# Patient Record
Sex: MALE | Race: WHITE | HISPANIC OR LATINO | Employment: FULL TIME | URBAN - METROPOLITAN AREA
[De-identification: names, ages, dates, MRNs, and addresses within clinical notes are randomized per-mention and may not be internally consistent; named-entity substitution may affect disease eponyms.]

---

## 2018-07-18 ENCOUNTER — THERAPY VISIT (OUTPATIENT)
Dept: OCCUPATIONAL THERAPY | Facility: CLINIC | Age: 23
End: 2018-07-18
Payer: COMMERCIAL

## 2018-07-18 DIAGNOSIS — M25.632 WRIST STIFFNESS, LEFT: Primary | ICD-10-CM

## 2018-07-18 DIAGNOSIS — S62.009A SCAPHOID FRACTURE: ICD-10-CM

## 2018-07-18 DIAGNOSIS — Z47.89 AFTERCARE FOLLOWING SURGERY OF THE MUSCULOSKELETAL SYSTEM: ICD-10-CM

## 2018-07-18 PROCEDURE — 97110 THERAPEUTIC EXERCISES: CPT | Mod: GO | Performed by: OCCUPATIONAL THERAPIST

## 2018-07-18 PROCEDURE — 97165 OT EVAL LOW COMPLEX 30 MIN: CPT | Mod: GO | Performed by: OCCUPATIONAL THERAPIST

## 2018-07-18 NOTE — PROGRESS NOTES
Hand Therapy Initial Evaluation    Current Date:  7/18/2018    Diagnosis: L scaphoid fracture ORIF  DOI: 03/03/18  DOS:   3/9/18   04/02/18  Procedure:  ORIF  Post:  >3 months    Precautions: NA    Subjective:  Maulik Connors is a 22 year old right hand dominant male.    Patient reports symptoms of pain, stiffness/loss of motion, weakness/loss of strength and edema of the left wrist which occurred due to fell snowboarding. Since onset symptoms are Gradually getting better.  Special tests:  x-ray and CT.  Previous treatment: scaphoid pinned on 3/9, then 2nd surgery w/bone graft on 4/2.    General health as reported by patient is excellent.  Pertinent medical history includes:None  Medical allergies:none.  Surgical history: orthopedic: wrists, other: tooth implant.  Medication history: vitamin C & D, claritin.    Occupational Profile Information:  Current occupation is student-computer science  Currently working in normal job without restrictions  Job Tasks: Computer Work  Prior functional level:  no limitations  Barriers include:none  Mobility: No difficulty  Transportation: drives  Leisure activities/hobbies: golfing, wake boarding    Functional Outcome Measure:   Upper Extremity Functional Index Score:  SCORE:   Column Totals: /80: 66   (A lower score indicates greater disability.)    Objective:  Pain Level Report  VAS(0-10) 7/18/2018   At Rest: 0/10   At worst: 6/10     Report of Pain:  Location:  Volar wrist, thumb CMC joint, dorsa/radial wrist  Pain Quality:  Aching and sore, dull  Frequency: intermittent    Pain is worst:  daytime  Exacerbated by:  Wrist movement  Relieved by:  rest  Progression:  improving  Edema:  MILD  Sensation: WNL throughout all nerve distributions; per patient report    ROM  Wrist  7/18/2018   AROM (PROM) right left   Extension 76 56   Flexion 66 14   RD 21 11   UD 32 16   Supination 90 90   Pronation 80 90     Strength:   (Measured in pounds)  Pain Report:  - none    + mild    ++  moderate    +++ severe     7/18/2018   Trials right left   1  2  3 96  92  79 42  46  45   Average: 89 44     Lat Pinch  7/18/2018   Trials right left   1  2  3 18  19  19 12  11  12   Average: 19 12     3 Pt Pinch  7/18/2018   Trials right left   1  2  3 18  16  17 10  12  11   Average: 17 11     Assessment:  Patient presents with symptoms consistent with diagnosis of left scaphoid fracture ORIF,  with surgical  intervention.     Patient's limitations or Problem List includes:  Decreased ROM/motion and Weakness of the left wrist which interferes with the patient's ability to perform Self Care Tasks (dressing, eating, bathing, hygiene/toileting), Work Tasks, Sleep Patterns, Recreational Activities, Household Chores and Driving  as compared to previous level of function.    Rehab Potential:  Good - Return to full activity, some limitations    Patient will benefit from skilled Occupational Therapy to increase ROM, overall strength and stability of wrist and decrease pain, edema and adherence of scarring to return to previous activity level and resume normal daily tasks and to reach their rehab potential.    Barriers to Learning:  No barrier    Communication Issues:  Patient appears to be able to clearly communicate and understand verbal and written communication and follow directions correctly.    Chart Review: Simple history review with patient    Identified Performance Deficits: bathing/showering, toileting, dressing, feeding, hygiene and grooming, driving and community mobility, work and leisure activities    Assessment of Occupational Performance:  5 or more Performance Deficits    Clinical Decision Making (Complexity): Low complexity    Treatment Explanation:  The following has been discussed with the patient:  RX ordered/plan of care  Anticipated outcomes  Possible risks and side effects    Plan:  Frequency:  1 X week, once daily  Duration:  for 6 weeks    Treatment Plan:   Modalities:  Fluidotherapy and  Paraffin  Therapeutic Exercise:  AROM, AAROM, PROM, Isotonics, Isometrics and Stabilization  Manual Techniques:  Scar mobilization and Myofascial release  Orthotic Fabrication:  PRN to increase ROM  Self Care:  Self Care Tasks  Discharge Plan:  Achieve all LTG.  Independent in home treatment program.  Reach maximal therapeutic benefit.    Home Exercise Program:  Weaning from OTC orthosis  AROM   Wrist  Strengthening      Supination/pronation    Next Visit:  A/AA/PROM  MFR  Scar mgmt  Strengthening, as appropriate

## 2018-07-18 NOTE — MR AVS SNAPSHOT
After Visit Summary   7/18/2018    Maulik Connors    MRN: [id number]           Patient Information     Date Of Birth          1995        Visit Information        Provider Department      7/18/2018 7:00 AM Karlie Flores OT [facility]        Today's Diagnoses     Wrist stiffness, left    -  1    Scaphoid fracture        Aftercare following surgery of the musculoskeletal system           Follow-ups after your visit        Your next 10 appointments already scheduled     Jul 25, 2018  7:00 AM CDT   ERUM Hand with Becky Fitzpatrick    [facility] ([facility])    [address] [place] [address] [id number] [phone]            Aug 01, 2018  5:00 PM CDT   ERUM Hand with FREIDA [facility] ([facility])    [address] [place] [address] [id number] [phone]            Aug 09, 2018  5:00 PM CDT   ERUM Hand with FREIDA [facility] ([facility])    [address] [place] [address] [id number] [phone]              Who to contact     If you have questions or need follow up information about today's clinic visit or your schedule please contact [facility] directly at [phone].  Normal or non-critical lab and imaging results will be communicated to you by MyChart, letter or phone within 4 business days after the clinic has received the results. If you do not hear from us within 7 days, please contact the clinic through MyChart or phone. If you have a critical or abnormal lab result, we will notify you by phone as soon as possible.  Submit refill requests through Teralynk or call your pharmacy and they will forward the refill request to us. Please allow 3 business days for your refill to be completed.          Additional Information About Your Visit        MyChart Information     QoniacMaceo lets  "you send messages to your doctor, view your test results, renew your prescriptions, schedule appointments and more. To sign up, go to [email or web address] . Click on \"Log in\" on the left side of the screen, which will take you to the Welcome page. Then click on \"Sign up Now\" on the right side of the page.     You will be asked to enter the access code listed below, as well as some personal information. Please follow the directions to create your username and password.     Your access code is: [email or web address]  Expires: 10/10/2018  6:30 AM     Your access code will  in 90 days. If you need help or a new code, please call your [place] clinic or [phone].        Care EveryWhere ID     This is your Care EveryWhere ID. This could be used by other organizations to access your [place] medical records  [id number]         Blood Pressure from Last 3 Encounters:   No data found for BP    Weight from Last 3 Encounters:   No data found for Wt              We Performed the Following     HC OT EVAL, LOW COMPLEXITY     ERUM INITIAL EVAL REPORT     THERAPEUTIC EXERCISES        Primary Care Provider Fax #    Physician No Ref-Primary [phone]       No address on file        Equal Access to Services     CHRISSIE CHRISTIANSON : Hadcirilo Faith, waanneliseda katie, qaybta kaalmada adeamy, ruthy ramirez. So [facility] [phone].    ATENCIÓN: Si habla español, tiene a melgar disposición servicios gratuitos de asistencia lingüística. Llame al [phone].    We comply with applicable federal civil rights laws and [place] laws. We do not discriminate on the basis of race, color, national origin, age, disability, sex, sexual orientation, or gender identity.            Thank you!     Thank you for choosing [facility]  for your care. Our goal is always to provide you with excellent care. Hearing back from our patients is one way we can continue to improve our services. Please take a few " minutes to complete the written survey that you may receive in the mail after your visit with us. Thank you!             Your Updated Medication List - Protect others around you: Learn how to safely use, store and throw away your medicines at www.disposemymeds.org.      Notice  As of 7/18/2018  8:14 AM    You have not been prescribed any medications.

## 2018-07-18 NOTE — LETTER
[facility]  [address] [address]  [phone]    2018    Re: Maulik Connors   :   1995  MRN:  [id number]   REFERRING PHYSICIAN:   [facility]  Date of Initial Evaluation:  2018  Visits:  Rxs Used: 1  Reason for Referral:     Wrist stiffness, left  Scaphoid fracture  Aftercare following surgery of the musculoskeletal system    EVALUATION SUMMARY    Hand Therapy Initial Evaluation  Current Date:  2018  Diagnosis: L scaphoid fracture ORIF  DOI: 18  DOS:   3/9/18   04/02/18  Procedure:  ORIF  Post:  >3 months  Precautions: NA    Subjective:  Maulik Connors is a 22 year old right hand dominant male.  Patient reports symptoms of pain, stiffness/loss of motion, weakness/loss of strength and edema of the left wrist which occurred due to fell snowboarding. Since onset symptoms are Gradually getting better.  Special tests:  x-ray and CT.  Previous treatment: scaphoid pinned on 3/9, then 2nd surgery w/bone graft on .    General health as reported by patient is excellent.  Pertinent medical history includes:None  Medical allergies:none.  Surgical history: orthopedic: wrists, other: tooth implant.  Medication history: vitamin C & D, claritin.    Occupational Profile Information:  Current occupation is student-computer science  Currently working in normal job without restrictions  Job Tasks: Computer Work  Prior functional level:  no limitations  Barriers include:none  Mobility: No difficulty  Transportation: drives  Leisure activities/hobbies: golfing, wake boarding      Maulik Connors     Functional Outcome Measure:   Upper Extremity Functional Index Score:  SCORE:   Column Totals: /80: 66   (A lower score indicates greater disability.)    Objective:  Pain Level Report  VAS(0-10) 2018   At Rest: 0/10   At worst: 6/10     Report of Pain:  Location:  Volar wrist, thumb CMC joint, dorsa/radial wrist  Pain  Quality:  Aching and sore, dull  Frequency: intermittent    Pain is worst:  daytime  Exacerbated by:  Wrist movement  Relieved by:  rest  Progression:  improving  Edema:  MILD  Sensation: WNL throughout all nerve distributions; per patient report    ROM  Wrist  7/18/2018   AROM (PROM) right left   Extension 76 56   Flexion 66 14   RD 21 11   UD 32 16   Supination 90 90   Pronation 80 90     Strength:   (Measured in pounds)  Pain Report:  - none    + mild    ++ moderate    +++ severe     7/18/2018   Trials right left   1  2  3 96  92  79 42  46  45   Average: 89 44     Lat Pinch  7/18/2018   Trials right left   1  2  3 18  19  19 12  11  12   Average: 19 12   Maulik Caughlan     3 Pt Pinch  7/18/2018   Trials right left   1  2  3 18  16  17 10  12  11   Average: 17 11     Assessment:  Patient presents with symptoms consistent with diagnosis of left scaphoid fracture ORIF,  with surgical  intervention.     Patient's limitations or Problem List includes:  Decreased ROM/motion and Weakness of the left wrist which interferes with the patient's ability to perform Self Care Tasks (dressing, eating, bathing, hygiene/toileting), Work Tasks, Sleep Patterns, Recreational Activities, Household Chores and Driving  as compared to previous level of function.    Rehab Potential:  Good - Return to full activity, some limitations    Patient will benefit from skilled Occupational Therapy to increase ROM, overall strength and stability of wrist and decrease pain, edema and adherence of scarring to return to previous activity level and resume normal daily tasks and to reach their rehab potential.    Barriers to Learning:  No barrier    Communication Issues:  Patient appears to be able to clearly communicate and understand verbal and written communication and follow directions correctly.    Chart Review: Simple history review with patient    Identified Performance Deficits: bathing/showering, toileting, dressing, feeding, hygiene and  grooming, driving and community mobility, work and leisure activities    Assessment of Occupational Performance:  5 or more Performance Deficits    Clinical Decision Making (Complexity): Low complexity    Treatment Explanation:  The following has been discussed with the patient:  RX ordered/plan of care  Anticipated outcomes  Possible risks and side effects    Plan:  Frequency:  1 X week, once daily  Duration:  for 6 weeks    Treatment Plan:   Modalities:  Fluidotherapy and Paraffin  Therapeutic Exercise:  AROM, AAROM, PROM, Isotonics, Isometrics and Stabilization  Manual Techniques:  Scar mobilization and Myofascial release  Orthotic Fabrication:  PRN to increase ROM  Self Care:  Self Care Tasks    Maulik Careyainsley     Discharge Plan:  Achieve all LTG.  Independent in home treatment program.  Reach maximal therapeutic benefit.    Home Exercise Program:  Weaning from OTC orthosis  AROM   Wrist  Strengthening      Supination/pronation    Next Visit:  A/AA/PROM  MFR  Scar mgmt  Strengthening, as appropriate        Thank you for your referral.    INQUIRIES  Therapist:ADEEL Adams/RASTA, MIRA   [facility]  [address] [address]  Phone: [phone]

## 2018-07-25 ENCOUNTER — THERAPY VISIT (OUTPATIENT)
Dept: OCCUPATIONAL THERAPY | Facility: CLINIC | Age: 23
End: 2018-07-25
Payer: COMMERCIAL

## 2018-07-25 DIAGNOSIS — Z47.89 AFTERCARE FOLLOWING SURGERY OF THE MUSCULOSKELETAL SYSTEM: ICD-10-CM

## 2018-07-25 DIAGNOSIS — M25.632 WRIST STIFFNESS, LEFT: ICD-10-CM

## 2018-07-25 PROCEDURE — 97110 THERAPEUTIC EXERCISES: CPT | Mod: GO | Performed by: OCCUPATIONAL THERAPIST

## 2018-07-25 PROCEDURE — 97140 MANUAL THERAPY 1/> REGIONS: CPT | Mod: GO | Performed by: OCCUPATIONAL THERAPIST

## 2018-07-25 NOTE — MR AVS SNAPSHOT
"              After Visit Summary   7/25/2018    Maulik Connors    MRN: [id number]           Patient Information     Date Of Birth          1995        Visit Information        Provider Department      7/25/2018 7:00 AM Becky Fitzpatrick [facility]        Today's Diagnoses     Wrist stiffness, left        Aftercare following surgery of the musculoskeletal system           Follow-ups after your visit        Your next 10 appointments already scheduled     Aug 01, 2018  5:00 PM CDT   ERUM Hand with FREIDA [facility] ([facility])    [address] [place] [id number] [phone]            Aug 09, 2018  5:00 PM CDT   ERUM Hand with FREIDA [facility] ([facility])    [address] [place] [id number] [phone]              Who to contact     If you have questions or need follow up information about today's clinic visit or your schedule please contact [facility] directly at [phone].  Normal or non-critical lab and imaging results will be communicated to you by [employer] Trackhart, letter or phone within 4 business days after the clinic has received the results. If you do not hear from us within 7 days, please contact the clinic through CallRestot or phone. If you have a critical or abnormal lab result, we will notify you by phone as soon as possible.  Submit refill requests through ActiveReplay or call your pharmacy and they will forward the refill request to us. Please allow 3 business days for your refill to be completed.          Additional Information About Your Visit        MyChart Information     ActiveReplay lets you send messages to your doctor, view your test results, renew your prescriptions, schedule appointments and more. To sign up, go to [email or web address] . Click on \"Log in\" on the left side of the screen, which will take you to the " "Welcome page. Then click on \"Sign up Now\" on the right side of the page.     You will be asked to enter the access code listed below, as well as some personal information. Please follow the directions to create your username and password.     Your access code is: [email or web address]  Expires: 10/10/2018  6:30 AM     Your access code will  in 90 days. If you need help or a new code, please call your [place] clinic or [phone].        Care EveryWhere ID     This is your Care EveryWhere ID. This could be used by other organizations to access your [place] medical records  [id number]         Blood Pressure from Last 3 Encounters:   No data found for BP    Weight from Last 3 Encounters:   No data found for Wt              We Performed the Following     MANUAL THER TECH     THERAPEUTIC EXERCISES        Primary Care Provider Fax #    Physician No Ref-Primary [phone]       No address on file        Equal Access to Services     [facility]: Hadii mike Faith, waaxda luqadaha, qaybta kaalmada adeamy, ruthy villeda . So [facility] [phone].    ATENCIÓN: Si habla español, tiene a melgar disposición servicios gratuitos de asistencia lingüística. Rubensame al [phone].    We comply with applicable federal civil rights laws and [place] laws. We do not discriminate on the basis of race, color, national origin, age, disability, sex, sexual orientation, or gender identity.            Thank you!     Thank you for choosing [facility]  for your care. Our goal is always to provide you with excellent care. Hearing back from our patients is one way we can continue to improve our services. Please take a few minutes to complete the written survey that you may receive in the mail after your visit with us. Thank you!             Your Updated Medication List - Protect others around you: Learn how to safely use, store and throw away your medicines at www.disposemymeds.org.      Notice  " As of 7/25/2018  7:37 AM    You have not been prescribed any medications.

## 2018-07-25 NOTE — PROGRESS NOTES
Hand Therapy SOAP  NOTE    Current Date:  7/25/2018    Diagnosis: L scaphoid fracture ORIF  DOI: 03/03/18  DOS:   3/9/18   04/02/18  Procedure:  ORIF      Subjective: IT is doing pretty good, the  Called and said they can see healing.  Maulik Connors is a 22 year old right hand dominant male.      Objective:  Pain Level Report  VAS(0-10) 7/18/2018 7/25/2018   At Rest: 0/10    At worst: 6/10 2/10       ROM  Wrist  7/18/2018 7/25/2018  Before 7/25/2018   AROM (PROM) right left Left     Extension 76 56 60    Flexion 66 14 15 25   RD 21 11     UD 32 16     Supination 90 90     Pronation 80 90             Assessment:  Patient presents with symptoms consistent with diagnosis of left scaphoid fracture ORIF,  with surgical  intervention. Progressing nicely, significant improvement today with gentle traction and glide. PROM and prolonged gentle stretch into flexion added today (Sitting in arm chair with gravity into flexion.     Patient's limitations or Problem List includes:  Decreased ROM/motion and Weakness of the left wrist which interferes with the patient's ability to perform Self Care Tasks (dressing, eating, bathing, hygiene/toileting), Work Tasks, Sleep Patterns, Recreational Activities, Household Chores and Driving  as compared to previous level of function.    Rehab Potential:  Good - Return to full activity, some limitations    Patient will benefit from skilled Occupational Therapy to increase ROM, overall strength and stability of wrist and decrease pain, edema and adherence of scarring to return to previous activity level and resume normal daily tasks and to reach their rehab potential.    Barriers to Learning:  No barrier    Communication Issues:  Patient appears to be able to clearly communicate and understand verbal and written communication and follow directions correctly.    Chart Review: Simple history review with patient    Identified Performance Deficits: bathing/showering, toileting, dressing,  feeding, hygiene and grooming, driving and community mobility, work and leisure activities    Assessment of Occupational Performance:  5 or more Performance Deficits    Clinical Decision Making (Complexity): Low complexity    Treatment Explanation:  The following has been discussed with the patient:  RX ordered/plan of care  Anticipated outcomes  Possible risks and side effects    Plan:  Frequency:  1 X week, once daily  Duration:  for 6 weeks    Treatment Plan:   Modalities:  Fluidotherapy and Paraffin  Therapeutic Exercise:  AROM, AAROM, PROM, Isotonics, Isometrics and Stabilization  Manual Techniques:  Scar mobilization and Myofascial release  Orthotic Fabrication:  PRN to increase ROM  Self Care:  Self Care Tasks  Discharge Plan:  Achieve all LTG.  Independent in home treatment program.  Reach maximal therapeutic benefit.    Home Exercise Program:  Weaning from OTC orthosis  AROM   Wrist  Strengthening      Supination/pronation    Next Visit:  A/AA/PROM  MFR  Scar mgmt  Strengthening, as appropriate

## 2018-08-01 ENCOUNTER — THERAPY VISIT (OUTPATIENT)
Dept: OCCUPATIONAL THERAPY | Facility: CLINIC | Age: 23
End: 2018-08-01
Payer: COMMERCIAL

## 2018-08-01 DIAGNOSIS — M25.632 WRIST STIFFNESS, LEFT: ICD-10-CM

## 2018-08-01 DIAGNOSIS — Z47.89 AFTERCARE FOLLOWING SURGERY OF THE MUSCULOSKELETAL SYSTEM: ICD-10-CM

## 2018-08-01 DIAGNOSIS — S62.009A SCAPHOID FRACTURE: ICD-10-CM

## 2018-08-01 PROCEDURE — 97140 MANUAL THERAPY 1/> REGIONS: CPT | Mod: GO | Performed by: OCCUPATIONAL THERAPIST

## 2018-08-01 PROCEDURE — 97110 THERAPEUTIC EXERCISES: CPT | Mod: GO | Performed by: OCCUPATIONAL THERAPIST

## 2018-08-01 NOTE — PROGRESS NOTES
SOAP note objective information for 8/1/2018.  ROM  Wrist  7/18/2018 7/25/2018  Before 7/25/2018 8/1/18   AROM (PROM) right left Left      Extension 76 56 60  63   Flexion 66 14 15 25 27   RD 21 11   13   UD 32 16   21   Please refer to the daily flowsheet for treatment today, total treatment time and time spent performing 1:1 timed codes.       Home Exercise Program:  Weaning from OTC orthosis  AROM   Wrist  Strengthening      Supination/pronation   Wrist isotonics    Next Visit:  A/AA/PROM  MFR  Scar mgmt  Strengthening, as appropriate

## 2018-08-01 NOTE — MR AVS SNAPSHOT
"              After Visit Summary   8/1/2018    Maulik Connors    MRN: [id number]           Patient Information     Date Of Birth          1995        Visit Information        Provider Department      8/1/2018 5:00 PM Karlie Flores OT [facility]        Today's Diagnoses     Wrist stiffness, left        Scaphoid fracture        Aftercare following surgery of the musculoskeletal system           Follow-ups after your visit        Your next 10 appointments already scheduled     Aug 09, 2018  5:00 PM CDT   ERUM Hand with [facility]RE[facility] ([facility])    [address] [place] [address]  4th [facility] [address] [phone]              Who to contact     If you have questions or need follow up information about today's clinic visit or your schedule please contact [facility] directly at [phone].  Normal or non-critical lab and imaging results will be communicated to you by MyChart, letter or phone within 4 business days after the clinic has received the results. If you do not hear from us within 7 days, please contact the clinic through [employer] or phone. If you have a critical or abnormal lab result, we will notify you by phone as soon as possible.  Submit refill requests through M[employer]I or call your pharmacy and they will forward the refill request to us. Please allow 3 business days for your refill to be completed.          Additional Information About Your Visit        MyChart Information     MOOI lets you send messages to your doctor, view your test results, renew your prescriptions, schedule appointments and more. To sign up, go to [email or web address] . Click on \"Log in\" on the left side of the screen, which will take you to the Welcome page. Then click on \"Sign up Now\" on the right side of the page.     You will be asked to enter the access code listed below, as well as some personal information. Please follow " the directions to create your username and password.     Your access code is: [email or web address]  Expires: 10/10/2018  6:30 AM     Your access code will  in 90 days. If you need help or a new code, please call your [place] clinic or [phone].        Care EveryWhere ID     This is your Care EveryWhere ID. This could be used by other organizations to access your [place] medical records  [id number]         Blood Pressure from Last 3 Encounters:   No data found for BP    Weight from Last 3 Encounters:   No data found for Wt              We Performed the Following     MANUAL THER TECH,1+REGIONS,EA 15 MIN     THERAPEUTIC EXERCISES        Primary Care Provider Fax #    Physician No Ref-Primary [phone]       No address on file        Equal Access to Services     CHRISSIE CHRISTIANSON : Hadii mike blueo Somakenzieali, waaxda luqadaha, qaybta kaalmada adeegyada, ruthy villeda . So [facility] [phone].    ATENCIÓN: Si habla español, tiene a melgar disposición servicios gratuitos de asistencia lingüística. Llame al [phone].    We comply with applicable federal civil rights laws and [place] laws. We do not discriminate on the basis of race, color, national origin, age, disability, sex, sexual orientation, or gender identity.            Thank you!     Thank you for choosing [facility]  for your care. Our goal is always to provide you with excellent care. Hearing back from our patients is one way we can continue to improve our services. Please take a few minutes to complete the written survey that you may receive in the mail after your visit with us. Thank you!             Your Updated Medication List - Protect others around you: Learn how to safely use, store and throw away your medicines at www.disposemymeds.org.      Notice  As of 2018 11:59 PM    You have not been prescribed any medications.

## 2018-08-09 ENCOUNTER — THERAPY VISIT (OUTPATIENT)
Dept: OCCUPATIONAL THERAPY | Facility: CLINIC | Age: 23
End: 2018-08-09
Payer: COMMERCIAL

## 2018-08-09 DIAGNOSIS — M25.632 WRIST STIFFNESS, LEFT: ICD-10-CM

## 2018-08-09 DIAGNOSIS — Z47.89 AFTERCARE FOLLOWING SURGERY OF THE MUSCULOSKELETAL SYSTEM: ICD-10-CM

## 2018-08-09 DIAGNOSIS — S62.009A SCAPHOID FRACTURE: ICD-10-CM

## 2018-08-09 PROCEDURE — 97110 THERAPEUTIC EXERCISES: CPT | Mod: GO | Performed by: OCCUPATIONAL THERAPIST

## 2018-08-09 PROCEDURE — 97140 MANUAL THERAPY 1/> REGIONS: CPT | Mod: GO | Performed by: OCCUPATIONAL THERAPIST

## 2018-08-09 NOTE — MR AVS SNAPSHOT
After Visit Summary   8/9/2018    Maulik Connors    MRN: [id number]           Patient Information     Date Of Birth          1995        Visit Information        Provider Department      8/9/2018 5:00 PM Karlie Flores OT [facility]        Today's Diagnoses     Wrist stiffness, left        Scaphoid fracture        Aftercare following surgery of the musculoskeletal system           Follow-ups after your visit        Your next 10 appointments already scheduled     Aug 16, 2018  9:30 AM CDT   ERUM Hand with [facility] ([facility])    [address] [place] [id number] [phone]            Aug 21, 2018  3:30 PM CDT   ERUM Hand with FREIDA [facility] ([facility])    [address] [place] [id number] [phone]            Aug 31, 2018  1:00 PM CDT   ERUM Hand with FRE[facility] ([facility])    [address] [place] [id number] [phone]            Sep 06, 2018  3:00 PM CDT   ERUM Hand with [facility] ([facility])    [address] [place] [id number] [phone]              Who to contact     If you have questions or need follow up information about today's clinic visit or your schedule please contact [facility] directly at [phone].  Normal or non-critical lab and imaging results will be communicated to you by MyChart, letter or phone within 4 business days after the clinic has received the results. If you do not hear from us within 7 days, please contact the clinic through MyChart or phone. If you have a critical or abnormal lab result, we will notify you by phone as soon as possible.  Submit refill requests through [employer] or call your  "pharmacy and they will forward the refill request to us. Please allow 3 business days for your refill to be completed.          Additional Information About Your Visit        MyChart Information     [employer] lets you send messages to your doctor, view your test results, renew your prescriptions, schedule appointments and more. To sign up, go to [email or web address] . Click on \"Log in\" on the left side of the screen, which will take you to the Welcome page. Then click on \"Sign up Now\" on the right side of the page.     You will be asked to enter the access code listed below, as well as some personal information. Please follow the directions to create your username and password.     Your access code is: [email or web address]  Expires: 10/10/2018  6:30 AM     Your access code will  in 90 days. If you need help or a new code, please call your [place] clinic or [phone].        Care EveryWhere ID     This is your Care EveryWhere ID. This could be used by other organizations to access your [place] medical records  [id number]         Blood Pressure from Last 3 Encounters:   No data found for BP    Weight from Last 3 Encounters:   No data found for Wt              We Performed the Following     MANUAL THER TECH,1+REGIONS,EA 15 MIN     THERAPEUTIC EXERCISES        Primary Care Provider Fax #    Physician No Ref-Primary [phone]       No address on file        Equal Access to Services     ANNELISE CHRISTIANSON : Hadii aad ku hadasho Somakenzieali, waaxda luqadaha, qaybta kaalmada patricia, ruthy villeda . So [facility] [phone].    ATENCIÓN: Si habla español, tiene a melgar disposición servicios gratuitos de asistencia lingüística. Llwilliam al [phone].    We comply with applicable federal civil rights laws and [place] laws. We do not discriminate on the basis of race, color, national origin, age, disability, sex, sexual orientation, or gender identity.            Thank you!     Thank you for choosing [facility]  for your care. Our goal is always to provide you with excellent care. Hearing back from our patients is one way we can continue to improve our services. Please take a few minutes to complete the written survey that you may receive in the mail after your visit with us. Thank you!             Your Updated Medication List - Protect others around you: Learn how to safely use, store and throw away your medicines at www.disposemymeds.org.      Notice  As of 8/9/2018 10:46 PM    You have not been prescribed any medications.

## 2018-08-09 NOTE — PROGRESS NOTES
SOAP note objective information for 8/9/2018.  ROM  Wrist  7/18/2018 7/25/2018  Before 7/25/2018 8/1/18 8/9/18   AROM (PROM) right left Left       Extension  After Tx 76 56 60  63 67  72   Flexion  After Tx 66 14 15 25 27 29  33   RD 21 11   13 15   UD 32 16   21 22   Please refer to the daily flowsheet for treatment today, total treatment time and time spent performing 1:1 timed codes.       Home Exercise Program:  Weaning from OTC orthosis  AROM     Wrist  Strengthening      Supination/pronation   Wrist isotonics    Next Visit:  A/AA/PROM  MFR  Scar mgmt  Strengthening, as appropriate

## 2018-08-21 ENCOUNTER — THERAPY VISIT (OUTPATIENT)
Dept: OCCUPATIONAL THERAPY | Facility: CLINIC | Age: 23
End: 2018-08-21
Payer: COMMERCIAL

## 2018-08-21 DIAGNOSIS — Z47.89 AFTERCARE FOLLOWING SURGERY OF THE MUSCULOSKELETAL SYSTEM: ICD-10-CM

## 2018-08-21 DIAGNOSIS — M25.632 WRIST STIFFNESS, LEFT: ICD-10-CM

## 2018-08-21 DIAGNOSIS — S62.009A SCAPHOID FRACTURE: ICD-10-CM

## 2018-08-21 PROCEDURE — 97140 MANUAL THERAPY 1/> REGIONS: CPT | Mod: GO | Performed by: OCCUPATIONAL THERAPIST

## 2018-08-21 PROCEDURE — 97110 THERAPEUTIC EXERCISES: CPT | Mod: GO | Performed by: OCCUPATIONAL THERAPIST

## 2018-08-21 NOTE — PROGRESS NOTES
"Hand Therapy Progress Note    Current Date:  8/21/2018    Diagnosis: L scaphoid fracture ORIF  DOI: 03/03/18  DOS:   3/9/18   04/02/18  Procedure:  ORIF    Reporting period is 7/18/18 to 8/21/2018    Subjective:   Subjective changes noted by patient:  \"It's overall better, but I do get some weird feelings in my hand and thumb.  It gets a little tingly also, but nothing significant.\"  Functional changes noted by patient:  Improvement in Self Care Tasks (dressing, eating, bathing, hygiene/toileting), Work Tasks, Sleep Patterns, Recreational Activities, Household Chores and Driving   Patient has noted adverse reaction to:  None    Functional Outcome Measure:  Upper Extremity Functional Index Score:  SCORE:   Column Totals: /80: 73   (A lower score indicates greater disability.)    Objective:  Pain Level Report  VAS(0-10) 7/18/2018 8/21/18   At Rest: 0/10 0/10   At worst: 6/10 4/10     Report of Pain:  Location:  Volar wrist, thumb CMC joint, scar  Pain Quality:  Aching and sore, dull  Frequency: intermittent    Pain is worst:  daytime  Exacerbated by:  Wrist movement  Relieved by:  rest  Progression:  improving  Edema:  MILD  Sensation: WNL throughout all nerve distributions; per patient report    ROM  Wrist  7/18/2018 8/21/18   AROM (PROM) right left    Extension 76 56 71   Flexion 66 14 32   RD 21 11 16   UD 32 16 20   Supination 90 90    Pronation 80 90      Strength:   (Measured in pounds)  Pain Report:  - none    + mild    ++ moderate    +++ severe     7/18/2018 8/21/18   Trials right left    1  2  3 96  92  79 42  46  45 71  69  61   Average: 89 44 67     Lat Pinch  7/18/2018 8/21/18   Trials right left    1  2  3 18  19  19 12  11  12 11  13  13   Average: 19 12 12     3 Pt Pinch  7/18/2018 8/21/18   Trials right left    1  2  3 18  16  17 10  12  11 12  12  12   Average: 17 11 12     Assessment:  Please refer to the daily flowsheet for treatment provided today.     Assessment:  Response to therapy has been " improvement to:  ROM of Wrist:  All Planes  Strength:   and pinch  Pain:  frequency is less, intensity of pain is decreased, duration of pain is decreased and less tender over affected area    Overall Assessment:  Patient's symptoms are resolving.  Patient is progressing well and is ready to decrease frequency of treatment in the clinic.  STG/LTG:  STGoals have been reviewed and progress or achievement has occurred;  see goal sheet for details and updates.    Plan:  Frequency/Duration:  Recommend continuing to see patient  2 X a month, once daily  for 22 months  Appropriateness of Rx I have re-evaluated this patient and find that the nature, scope, duration and intensity of the therapy is appropriate for the medical condition of the patient.  Recommendations for Continued Therapy  Additions to Treatment Plan -  Therapeutic Exercise:  Isotonic strengthening    Home Exercise Program:  AROM   Wrist  Strengthening      Supination/pronation   Wrist isotonics    Next Visit:  A/AA/PROM  MFR  Scar mgmt  Strengthening, as appropriate

## 2018-08-21 NOTE — MR AVS SNAPSHOT
After Visit Summary   8/21/2018    Maulik Connors    MRN: [id number]           Patient Information     Date Of Birth          1995        Visit Information        Provider Department      8/21/2018 3:30 PM Karlie Flores OT [facility]        Today's Diagnoses     Wrist stiffness, left        Scaphoid fracture        Aftercare following surgery of the musculoskeletal system           Follow-ups after your visit        Your next 10 appointments already scheduled     Sep 06, 2018  3:00 PM CDT   ERUM Hand with [facility]REIDA [facility] ([facility])    [address] [place] [address] [phone]            Sep 25, 2018  8:30 AM CDT   ERUM Hand with FREIDA [facility] ([facility])    [address] [place] [address] [phone]              Who to contact     If you have questions or need follow up information about today's clinic visit or your schedule please contact [facility] directly at [phone].  Normal or non-critical lab and imaging results will be communicated to you by [employer], letter or phone within 4 business days after the clinic has received the results. If you do not hear from us within 7 days, please contact the clinic through [employer] or phone. If you have a critical or abnormal lab result, we will notify you by phone as soon as possible.  Submit refill requests through [employer] or call your pharmacy and they will forward the refill request to us. Please allow 3 business days for your refill to be completed.          Additional Information About Your Visit        Minkahart Information     [employer] gives you secure access to your electronic health record. If you see a primary care provider, you can also send messages to your care team and make appointments. If you have questions, please call your primary  care clinic.  If you do not have a primary care provider, please call [phone] and they will assist you.        Care EveryWhere ID     This is your Care EveryWhere ID. This could be used by other organizations to access your [place] medical records  [id number]         Blood Pressure from Last 3 Encounters:   No data found for BP    Weight from Last 3 Encounters:   No data found for Wt              We Performed the Following     ERUM PROGRESS NOTES REPORT     MANUAL THER TECH,1+REGIONS,EA 15 MIN     THERAPEUTIC EXERCISES        Primary Care Provider Fax #    Physician No Ref-Primary [phone]       No address on file        Equal Access to Services     [facility]: Hadii aad ku hadasho Somakenzieali, waaxda luqadaha, qaybta kaalmada adeamy, ruthy villeda . So [facility] [phone].    ATENCIÓN: Si habla español, tiene a melgar disposición servicios gratuitos de asistencia lingüística. [facility] [phone].    We comply with applicable federal civil rights laws and [place] laws. We do not discriminate on the basis of race, color, national origin, age, disability, sex, sexual orientation, or gender identity.            Thank you!     Thank you for [facility] your [facility]. Our goal is always to provide you with excellent care. Hearing back from our patients is one way we can continue to improve our services. Please take a few minutes to complete the written survey that you may receive in the mail after your visit with us. Thank you!             Your Updated Medication List - Protect others around you: Learn how to safely use, store and throw away your medicines at www.disposemymeds.org.      Notice  As of 8/21/2018  6:19 PM    You have not been prescribed any medications.

## 2018-08-21 NOTE — LETTER
"[facility]  [address] [address]  [phone]    2018    Re: Maulik Connors   :   1995  MRN:  [id number]   REFERRING PHYSICIAN:   Marcus Lin MD    [facility]    Date of Initial Evaluation:  2018  Visits:  Rxs Used: 5  Reason for Referral:     Wrist stiffness, left  Scaphoid fracture  Aftercare following surgery of the musculoskeletal system    EVALUATION SUMMARY    Hand Therapy Progress Note  Current Date:  2018  Diagnosis: L scaphoid fracture ORIF  DOI: 18  DOS:   3/9/18   04/02/18  Procedure:  ORIF    Reporting period is 18 to 2018    Subjective:   Subjective changes noted by patient:  \"It's overall better, but I do get some weird feelings in my hand and thumb.  It gets a little tingly also, but nothing significant.\"  Functional changes noted by patient:  Improvement in Self Care Tasks (dressing, eating, bathing, hygiene/toileting), Work Tasks, Sleep Patterns, Recreational Activities, Household Chores and Driving   Patient has noted adverse reaction to:  None    Functional Outcome Measure:  Upper Extremity Functional Index Score:  SCORE:   Column Totals: /80: 73   (A lower score indicates greater disability.)    Objective:  Pain Level Report  VAS(0-10) 2018   At Rest: 0/10 0/10   At worst: 6/10 4/10   Maulik Connors      Report of Pain:  Location:  Volar wrist, thumb CMC joint, scar  Pain Quality:  Aching and sore, dull  Frequency: intermittent    Pain is worst:  daytime  Exacerbated by:  Wrist movement  Relieved by:  rest  Progression:  improving  Edema:  MILD  Sensation: WNL throughout all nerve distributions; per patient report    ROM  Wrist  2018   AROM (PROM) right left    Extension 76 56 71   Flexion 66 14 32   RD 21 11 16   UD 32 16 20   Supination 90 90    Pronation 80 90      Strength:   (Measured in pounds)  Pain Report:  - none    + mild    ++ moderate    +++ severe "     7/18/2018 8/21/18   Trials right left    1  2  3 96  92  79 42  46  45 71  69  61   Average: 89 44 67     Lat Pinch  7/18/2018 8/21/18   Trials right left    1  2  3 18  19  19 12  11  12 11  13  13   Average: 19 12 12     3 Pt Pinch  7/18/2018 8/21/18   Trials right left    1  2  3 18  16  17 10  12  11 12  12  12   Average: 17 11 12     Assessment:  Please refer to the daily flowsheet for treatment provided today.       Maulik Connors      Assessment:  Response to therapy has been improvement to:  ROM of Wrist:  All Planes  Strength:   and pinch  Pain:  frequency is less, intensity of pain is decreased, duration of pain is decreased and less tender over affected area    Overall Assessment:  Patient's symptoms are resolving.  Patient is progressing well and is ready to decrease frequency of treatment in the clinic.  STG/LTG:  STGoals have been reviewed and progress or achievement has occurred;  see goal sheet for details and updates.    Plan:  Frequency/Duration:  Recommend continuing to see patient  2 X a month, once daily  for 22 months  Appropriateness of Rx I have re-evaluated this patient and find that the nature, scope, duration and intensity of the therapy is appropriate for the medical condition of the patient.  Recommendations for Continued Therapy  Additions to Treatment Plan -  Therapeutic Exercise:  Isotonic strengthening    Home Exercise Program:  AROM   Wrist  Strengthening      Supination/pronation   Wrist isotonics    Next Visit:  A/AA/PROM  MFR  Scar mgmt  Strengthening, as appropriate    Thank you for your referral.    INQUIRIES  Therapist: AVELINA Adams/L, MIRA   [facility]  [address] [place] [address]  Phone: [phone]

## 2018-09-25 ENCOUNTER — THERAPY VISIT (OUTPATIENT)
Dept: OCCUPATIONAL THERAPY | Facility: CLINIC | Age: 23
End: 2018-09-25
Payer: COMMERCIAL

## 2018-09-25 DIAGNOSIS — Z47.89 AFTERCARE FOLLOWING SURGERY OF THE MUSCULOSKELETAL SYSTEM: ICD-10-CM

## 2018-09-25 DIAGNOSIS — S62.009A SCAPHOID FRACTURE: ICD-10-CM

## 2018-09-25 DIAGNOSIS — M25.632 WRIST STIFFNESS, LEFT: ICD-10-CM

## 2018-09-25 PROCEDURE — 97112 NEUROMUSCULAR REEDUCATION: CPT | Mod: GO | Performed by: OCCUPATIONAL THERAPIST

## 2018-09-25 PROCEDURE — 97140 MANUAL THERAPY 1/> REGIONS: CPT | Mod: GO | Performed by: OCCUPATIONAL THERAPIST

## 2018-09-25 PROCEDURE — 97110 THERAPEUTIC EXERCISES: CPT | Mod: GO | Performed by: OCCUPATIONAL THERAPIST

## 2018-09-25 NOTE — PROGRESS NOTES
"Hand Therapy Discharge Note    Current Date:  9/25/2018    Diagnosis: L scaphoid fracture ORIF  DOI: 03/03/18  DOS:   3/9/18   04/02/18  Procedure:  ORIF    Reporting period is 8/21/18 to 9/25/2018    Subjective:   Subjective changes noted by patient:  \"My wrist has been doing well.  I've been taking a golf class and scuba class and it's been good!\"  Functional changes noted by patient:  Improvement in Self Care Tasks (dressing, eating, bathing, hygiene/toileting), Work Tasks, Sleep Patterns, Recreational Activities, Household Chores and Driving   Patient has noted adverse reaction to:  None    Functional Outcome Measure:  Upper Extremity Functional Index Score:  SCORE:   Column Totals: /80: 77   (A lower score indicates greater disability.)    Objective:  Pain Level Report  VAS(0-10) 7/18/2018 8/21/18 9/25/18   At Rest: 0/10 0/10 0/10   At worst: 6/10 4/10 4/10     Report of Pain:  Location:  Volar wrist, thumb CMC joint, scar  Pain Quality:  Aching and sore, dull  Frequency: intermittent    Pain is worst:  daytime  Exacerbated by:  Wrist movement  Relieved by:  rest  Progression:  improving  Edema:  MILD  Sensation: WNL throughout all nerve distributions; per patient report    ROM  Wrist  7/18/2018 8/21/18 9/25/18   AROM (PROM) right left     Extension 76 56 71 74   Flexion 66 14 32 38   RD 21 11 16 16   UD 32 16 20 21   Supination 90 90     Pronation 80 90       Strength:   (Measured in pounds)  Pain Report:  - none    + mild    ++ moderate    +++ severe     7/18/2018 8/21/18 9/25/18   Trials right left     1  2  3 96  92  79 42  46  45 71  69  61 70  68  67   Average: 89 44 67 68     Lat Pinch  7/18/2018 8/21/18 9/25/18   Trials right left     1  2  3 18  19  19 12  11  12 11  13  13 15  14  15   Average: 19 12 12 15     3 Pt Pinch  7/18/2018 8/21/18 9/25/18   Trials right left     1  2  3 18  16  17 10  12  11 12  12  12 13  16  15   Average: 17 11 12 15     Please refer to the daily flowsheet for treatment " provided today.     Assessment:  Response to therapy has been improvement to:  ROM of Wrist:  All Planes  Strength:   and pinch    Overall Assessment:  Patient's symptoms are resolving.  STG/LTG:  STGoals have been reviewed and progress or achievement has occurred;  see goal sheet for details and updates.    Plan:  Frequency/Duration:  DC to I HEP.  Appropriateness of Rx I have re-evaluated this patient and find that the nature, scope, duration and intensity of the therapy is appropriate for the medical condition of the patient.  Recommendations for Continued Therapy: discontinue to HEP    Home Exercise Program:  AROM   Wrist  Strengthening      Supination/pronation   Wrist isotonics

## 2018-09-25 NOTE — MR AVS SNAPSHOT
After Visit Summary   9/25/2018    Maulik Connors    MRN: [id number]           Patient Information     Date Of Birth          1995        Visit Information        Provider Department      9/25/2018 8:30 AM Karlie Flores OT [facility]        Today's Diagnoses     Wrist stiffness, left        Scaphoid fracture        Aftercare following surgery of the musculoskeletal system           Follow-ups after your visit        Who to contact     If you have questions or need follow up information about today's clinic visit or your schedule please contact [facility]ER[facility] directly at [phone].  Normal or non-critical lab and imaging results will be communicated to you by [employer], letter or phone within 4 business days after the clinic has received the results. If you do not hear from us within 7 days, please contact the clinic through [employer] or phone. If you have a critical or abnormal lab result, we will notify you by phone as soon as possible.  Submit refill requests through [employer] or call your pharmacy and they will forward the refill request to us. Please allow 3 business days for your refill to be completed.          Additional Information About Your Visit        MyChart Information     [employer] gives you secure access to your electronic health record. If you see a primary care provider, you can also send messages to your care team and make appointments. If you have questions, please call your primary care clinic.  If you do not have a primary care provider, please call [phone] and they will assist you.        Care EveryWhere ID     This is your Care EveryWhere ID. This could be used by other organizations to access your [place] medical records  [id number]         Blood Pressure from Last 3 Encounters:   No data found for BP    Weight from Last 3 Encounters:   No data found for Wt              We Performed the Following     MANUAL THER TECH,1+REGIONS,EA 15 MIN      NEUROMUSCULAR RE-EDUCATION     THERAPEUTIC EXERCISES        Primary Care Provider Fax #    Physician No Ref-Primary [phone]       No address on file        Equal Access to Services     CHRISSIE CHRISTIANSON : Hadii aad ku hadvarshatamera Maryestefany, aly milagrosmonetha, gladys gomez, ruthy ramirez. So [facility] [phone].    ATENCIÓN: Si habla español, tiene a melgar disposición servicios gratuitos de asistencia lingüística. Llame al [phone].    We comply with applicable federal civil rights laws and [place] laws. We do not discriminate on the basis of race, color, national origin, age, disability, sex, sexual orientation, or gender identity.            Thank you!     Thank you for choosing [facility] HAND THERAPY  for your care. Our goal is always to provide you with excellent care. Hearing back from our patients is one way we can continue to improve our services. Please take a few minutes to complete the written survey that you may receive in the mail after your visit with us. Thank you!             Your Updated Medication List - Protect others around you: Learn how to safely use, store and throw away your medicines at www.disposemymeds.org.      Notice  As of 9/25/2018  9:12 AM    You have not been prescribed any medications.

## 2018-11-13 ENCOUNTER — THERAPY VISIT (OUTPATIENT)
Dept: OCCUPATIONAL THERAPY | Facility: CLINIC | Age: 23
End: 2018-11-13
Payer: COMMERCIAL

## 2018-11-13 DIAGNOSIS — M25.532 LEFT WRIST PAIN: Primary | ICD-10-CM

## 2018-11-13 PROCEDURE — 97110 THERAPEUTIC EXERCISES: CPT | Mod: GO | Performed by: OCCUPATIONAL THERAPIST

## 2018-11-13 PROCEDURE — 97112 NEUROMUSCULAR REEDUCATION: CPT | Mod: GO | Performed by: OCCUPATIONAL THERAPIST

## 2018-11-13 PROCEDURE — 97140 MANUAL THERAPY 1/> REGIONS: CPT | Mod: GO | Performed by: OCCUPATIONAL THERAPIST

## 2018-11-13 NOTE — MR AVS SNAPSHOT
After Visit Summary   11/13/2018    Maulik Connors    MRN: [id number]           Patient Information     Date Of Birth          1995        Visit Information        Provider Department      11/13/2018 12:30 PM [facility]        Today's Diagnoses     Left wrist pain    -  1       Follow-ups after your visit        Your next 10 appointments already scheduled     Nov 20, 2018  5:30 PM CST   ERUM Hand with [facility] ([facility])    [address] [place] [id number] [phone]            Nov 29, 2018 11:00 AM CST   ERUM Hand with [facility] ([facility])    [address] [place] [id number] [phone]            Dec 06, 2018 11:00 AM CST   ERUM Hand with [facility] ([facility])    [address] [place] [id number] [phone]              Who to contact     If you have questions or need follow up information about today's clinic visit or your schedule please contact [facility] directly at [phone].  Normal or non-critical lab and imaging results will be communicated to you by [employer]art, letter or phone within 4 business days after the clinic has received the results. If you do not hear from us within 7 days, please contact the clinic through [employer] or phone. If you have a critical or abnormal lab result, we will notify you by phone as soon as possible.  Submit refill requests through [employer] or call your pharmacy and they will forward the refill request to us. Please allow 3 business days for your refill to be completed.          Additional Information About Your Visit        [employer] Information     [employer] gives you secure access to your electronic health record. If you see a primary care  provider, you can also send messages to your care team and make appointments. If you have questions, please call your primary care clinic.  If you do not have a primary care provider, please call [phone] and they will assist you.        Care EveryWhere ID     This is your Care EveryWhere ID. This could be used by other organizations to access your [place] medical records  [id number]         Blood Pressure from Last 3 Encounters:   No data found for BP    Weight from Last 3 Encounters:   No data found for Wt              We Performed the Following     MANUAL THER TECH,1+REGIONS,EA 15 MIN     NEUROMUSCULAR RE-EDUCATION     THERAPEUTIC EXERCISES        Primary Care Provider Fax #    Physician No Ref-Primary [phone]       No address on file        Equal Access to Services     ANNELISE CHRISTIANSON : Hadii mike Faith, waanam wilson, gladys kaalmada patricia, ruthy villeda . So [facility] [phone].    ATENCIÓN: Si habla español, tiene a melgar disposición servicios gratuitos de asistencia lingüística. Llame al [phone].    We comply with applicable federal civil rights laws and [place] laws. We do not discriminate on the basis of race, color, national origin, age, disability, sex, sexual orientation, or gender identity.            Thank you!     Thank you for choosing [facility] TH[facility]  for your care. Our goal is always to provide you with excellent care. Hearing back from our patients is one way we can continue to improve our services. Please take a few minutes to complete the written survey that you may receive in the mail after your visit with us. Thank you!             Your Updated Medication List - Protect others around you: Learn how to safely use, store and throw away your medicines at www.disposemymeds.org.      Notice  As of 11/13/2018 11:59 PM    You have not been prescribed any medications.

## 2018-11-13 NOTE — PROGRESS NOTES
"Hand Therapy Progress Note    Current Date:  11/13/2018    Diagnosis: L scaphoid fracture ORIF  DOI: 03/03/18  DOS:   3/9/18   04/02/18  Procedure:  ORIF    Reporting period is 9/25/18 to 11/13/2018    Subjective:   Subjective changes noted by patient:  \"It's been more painful when I this (forced extension) and these (RD/UD) direction.  I'm not sure why.\"  Functional changes noted by patient:  No Change to Self Care Tasks (dressing, eating, bathing, hygiene/toileting), Work Tasks, Sleep Patterns, Recreational Activities, Household Chores and Driving   Patient has noted adverse reaction to:  None    Objective:  Pain Level Report  VAS(0-10) 7/18/2018 8/21/18 9/25/18 11/13/18   At Rest: 0/10 0/10 0/10 0/10   At worst: 6/10 4/10 4/10 6/10     Report of Pain:  Location:  Volar wrist, thumb CMC joint, radial wrist  Pain Quality:  sharp  Frequency: intermittent    Pain is worst:  daytime  Exacerbated by:  RD/UD, wrist extension  Relieved by:  rest  Progression:  Staying the same these last two weeks.  Edema:  MILD  Sensation: WNL throughout all nerve distributions; per patient report    ROM  Wrist  7/18/2018 8/21/18 9/25/18 11/13/18   AROM (PROM) right left      Extension 76 56 71 74 81++   Flexion 66 14 32 38 45   RD 21 11 16 16 17+   UD 32 16 20 21 17++   Supination 90 90      Pronation 80 90        Provocative Test 11/13/2018   Finkelstein's 6/10   Resisted    APL -   EPB -       Strength:   (Measured in pounds)  Pain Report:  - none    + mild    ++ moderate    +++ severe     7/18/2018 8/21/18 9/25/18 11/13/18   Trials right left      1  2  3 96  92  79 42  46  45 71  69  61 70  68  67 73  81  82   Average: 89 44 67 68 79     Lat Pinch  7/18/2018 8/21/18 9/25/18 11/13/18   Trials right left      1  2  3 18  19  19 12  11  12 11  13  13 15  14  15 14  16  15   Average: 19 12 12 15 15     3 Pt Pinch  7/18/2018 8/21/18 9/25/18 11/13/18   Trials right left      1  2  3 18  16  17 10  12  11 12  12  12 13  16  15 " 18  18  18   Average: 17 11 12 15 18     Please refer to the daily flowsheet for treatment provided today.     Assessment:  Response to therapy has been improvement to:  ROM of Wrist:  All Planes  Strength:   and pinch    Overall Assessment:  Patient's symptoms are resolving.  Patient would benefit from continued therapy to achieve rehab potential  STG/LTG:  STGoals have been reviewed and progress or achievement has occurred;  see goal sheet for details and updates.    Plan:  Frequency/Duration:  Recommend continuing to see patient  1 X week, once daily  for 4 weeks  Appropriateness of Rx I have re-evaluated this patient and find that the nature, scope, duration and intensity of the therapy is appropriate for the medical condition of the patient.  Recommendations for Continued Therapy  Pt. Has had a flare in symptoms, it appears to be more of a tendinitis flare.    Home Exercise Program:  AROM   Wrist  Scar massage  Massage to katerina    Next Visit:  SARAH Ross. Mobs  A/AA/PROM  Strengthening, if tolerated

## 2018-11-14 PROBLEM — M25.532 LEFT WRIST PAIN: Status: ACTIVE | Noted: 2018-11-14

## 2018-11-20 ENCOUNTER — THERAPY VISIT (OUTPATIENT)
Dept: OCCUPATIONAL THERAPY | Facility: CLINIC | Age: 23
End: 2018-11-20
Payer: COMMERCIAL

## 2018-11-20 DIAGNOSIS — M25.532 LEFT WRIST PAIN: ICD-10-CM

## 2018-11-20 PROCEDURE — 97110 THERAPEUTIC EXERCISES: CPT | Mod: GO | Performed by: OCCUPATIONAL THERAPIST

## 2018-11-20 PROCEDURE — 97140 MANUAL THERAPY 1/> REGIONS: CPT | Mod: GO | Performed by: OCCUPATIONAL THERAPIST

## 2018-11-20 PROCEDURE — 97022 WHIRLPOOL THERAPY: CPT | Mod: GO | Performed by: OCCUPATIONAL THERAPIST

## 2018-11-20 NOTE — PROGRESS NOTES
SOAP note objective information for 11/20/2018.  ROM  Wrist  7/18/2018 8/21/18 9/25/18 11/13/18 11/20/18   AROM (PROM) right left       Extension 76 56 71 74 81++ 77   Flexion 66 14 32 38 45 43   RD 21 11 16 16 17+ 17   UD 32 16 20 21 17++ 20   Supination 90 90       Pronation 80 90       Please refer to the daily flowsheet for treatment today, total treatment time and time spent performing 1:1 timed codes.       Home Exercise Program:  AROM   Wrist  Scar massage  Massage to forearm   strengthening    Next Visit:  SARAH CRAMERt. Mobs  A/AA/PROM  Strengthening, if tolerated

## 2018-11-20 NOTE — MR AVS SNAPSHOT
After Visit Summary   11/20/2018    Maulik Connors    MRN: [id number]           Patient Information     Date Of Birth          1995        Visit Information        Provider Department      11/20/2018 5:30 PM [facility]ile Flores [facility]        Today's Diagnoses     Left wrist pain           Follow-ups after your visit        Your next 10 appointments already scheduled     Nov 29, 2018 11:00 AM CST   [facility] Hand [facility] ([facility])    [address] [place] [id number] [phone]            Dec 06, 2018 11:00 AM CST   ERUM Hand with Karlie Flores OT    [facility] ([facility])    [address] [place] [id number] [phone]              Who to contact     If you have questions or need follow up information about today's clinic visit or your schedule please contact [facility] HAND THERAP[facility] directly at [phone].  Normal or non-critical lab and imaging results will be communicated to you by [employer], letter or phone within 4 business days after the clinic has received the results. If you do not hear from us within 7 days, please contact the clinic through [employer] or phone. If you have a critical or abnormal lab result, we will notify you by phone as soon as possible.  Submit refill requests through [employer] or call your pharmacy and they will forward the refill request to us. Please allow 3 business days for your refill to be completed.          Additional Information About Your Visit        MyChart Information     [employer] gives you secure access to your electronic health record. If you see a primary care provider, you can also send messages to your care team and make appointments. If you have questions, please call your primary care clinic.  If you do not have a primary care provider, please call [phone] and they will  assist you.        Care EveryWhere ID     This is your Care EveryWhere ID. This could be used by other organizations to access your [place] medical records  [id number]         Blood Pressure from Last 3 Encounters:   No data found for BP    Weight from Last 3 Encounters:   No data found for Wt              We Performed the Following     MANUAL THER TECH,1+REGIONS,EA 15 MIN     THERAPEUTIC EXERCISES     [facility]        Primary Care Provider Fax #    Physician No Ref-Primary [phone]       No address on file        Equal Access to Services     CHRISSIE CHRISTIANSON : Hadii aad ku hadasho Soomaali, waaxda luqadaha, qaybta kaalmada adeegyada, waxay idiin hayaan adeeg kharash la'aan . So [facility] [phone].    ATENCIÓN: Si habla español, tiene a melgar disposición servicios gratuitos de asistencia lingüística. [facility] [phone].    We comply with applicable federal civil rights laws and [place] laws. We do not discriminate on the basis of race, color, national origin, age, disability, sex, sexual orientation, or gender identity.            Thank you!     Thank you for choosing [facility] HAND THERAPY  for your care. Our goal is always to provide you with excellent care. Hearing back from our patients is one way we can continue to improve our services. Please take a few minutes to complete the written survey that you may receive in the mail after your visit with us. Thank you!             Your Updated Medication List - Protect others around you: Learn how to safely use, store and throw away your medicines at www.disposemymeds.org.      Notice  As of 11/20/2018  6:01 PM    You have not been prescribed any medications.

## 2018-11-29 ENCOUNTER — THERAPY VISIT (OUTPATIENT)
Dept: OCCUPATIONAL THERAPY | Facility: CLINIC | Age: 23
End: 2018-11-29
Payer: COMMERCIAL

## 2018-11-29 DIAGNOSIS — M25.532 LEFT WRIST PAIN: ICD-10-CM

## 2018-11-29 PROCEDURE — 97140 MANUAL THERAPY 1/> REGIONS: CPT | Mod: GO | Performed by: OCCUPATIONAL THERAPIST

## 2018-11-29 PROCEDURE — 97110 THERAPEUTIC EXERCISES: CPT | Mod: GO | Performed by: OCCUPATIONAL THERAPIST

## 2018-11-29 NOTE — MR AVS SNAPSHOT
After Visit Summary   11/29/2018    Maulik Connors    MRN: [id number]           Patient Information     Date Of Birth          1995        Visit Information        Provider Department      11/29/2018 11:00 AM Karlie Flores OT [facility]        Today's Diagnoses     Left wrist pain           Follow-ups after your visit        Your next 10 appointments already scheduled     Dec 06, 2018 11:00 AM CST   [facility] Hand [facility] ([facility])    [address] [place] [address] [phone]              Who to contact     If you have questions or need follow up information about today's clinic visit or your schedule please contact [facility]ER[facility] directly at [phone].  Normal or non-critical lab and imaging results will be communicated to you by MyChart, letter or phone within 4 business days after the clinic has received the results. If you do not hear from us within 7 days, please contact the clinic through [employer] or phone. If you have a critical or abnormal lab result, we will notify you by phone as soon as possible.  Submit refill requests through [employer] or call your pharmacy and they will forward the refill request to us. Please allow 3 business days for your refill to be completed.          Additional Information About Your Visit        MyChart Information     [employer] gives you secure access to your electronic health record. If you see a primary care provider, you can also send messages to your care team and make appointments. If you have questions, please call your primary care clinic.  If you do not have a primary care provider, please call [phone] and they will assist you.        Care EveryWhere ID     This is your Care EveryWhere ID. This could be used by other organizations to access your [place] medical records  [id number]         Blood Pressure from Last 3  Encounters:   No data found for BP    Weight from Last 3 Encounters:   No data found for Wt              We Performed the Following     MANUAL THER TECH,1+REGIONS,EA 15 MIN     THERAPEUTIC EXERCISES        Primary Care Provider Fax #    Physician No Ref-Primary [phone]       No address on file        Equal Access to Services     CHRISSIE MEGHAN : Hadii mike reeder suo Soomaali, waaxda luqadaha, qaybta kaalmada adeegyada, waxtyron timothy vikramn jefferycammie foxrodger villeda . So [facility] [phone].    ATENCIÓN: Si habla español, tiene a melgar disposición servicios gratuitos de asistencia lingüística. Llame al [phone].    We comply with applicable federal civil rights laws and [place] laws. We do not discriminate on the basis of race, color, national origin, age, disability, sex, sexual orientation, or gender identity.            Thank you!     Thank you for choosing [facility] THERAPY  for your care. Our goal is always to provide you with excellent care. Hearing back from our patients is one way we can continue to improve our services. Please take a few minutes to complete the written survey that you may receive in the mail after your visit with us. Thank you!             Your Updated Medication List - Protect others around you: Learn how to safely use, store and throw away your medicines at www.disposemymeds.org.      Notice  As of 11/29/2018 11:26 PM    You have not been prescribed any medications.

## 2018-11-29 NOTE — PROGRESS NOTES
SOAP note objective information for 11/29/2018.  ROM  Wrist  7/18/2018 8/21/18 9/25/18 11/13/18 11/20/18 11/29/18   AROM (PROM) right left        Extension 76 56 71 74 81++ 77 76   Flexion 66 14 32 38 45 43 38   RD 21 11 16 16 17+ 17 17   UD 32 16 20 21 17++ 20 20++   Supination 90 90        Pronation 80 90        Please refer to the daily flowsheet for treatment today, total treatment time and time spent performing 1:1 timed codes.       Home Exercise Program:  AROM   Wrist  Scar massage  Massage to forearm   strengthening    Next Visit:  SARAH  Jt. Mobs  A/AA/PROM  Strengthening, if tolerated

## 2018-12-06 ENCOUNTER — THERAPY VISIT (OUTPATIENT)
Dept: OCCUPATIONAL THERAPY | Facility: CLINIC | Age: 23
End: 2018-12-06
Payer: COMMERCIAL

## 2018-12-06 DIAGNOSIS — M25.532 LEFT WRIST PAIN: ICD-10-CM

## 2018-12-06 PROCEDURE — 97110 THERAPEUTIC EXERCISES: CPT | Mod: GO | Performed by: OCCUPATIONAL THERAPIST

## 2018-12-06 PROCEDURE — 97140 MANUAL THERAPY 1/> REGIONS: CPT | Mod: GO | Performed by: OCCUPATIONAL THERAPIST

## 2018-12-06 PROCEDURE — 97112 NEUROMUSCULAR REEDUCATION: CPT | Mod: GO | Performed by: OCCUPATIONAL THERAPIST

## 2018-12-06 NOTE — PROGRESS NOTES
SOAP note objective information for 12/6/2018.  ROM  Wrist  7/18/2018 8/21/18 9/25/18 11/13/18 11/20/18 11/29/18 12/6/18   AROM (PROM) right left         Extension 76 56 71 74 81++ 77 76 78   Flexion 66 14 32 38 45 43 38 40   RD 21 11 16 16 17+ 17 17 16++   UD 32 16 20 21 17++ 20 20++ 18++   Supination 90 90         Pronation 80 90         Please refer to the daily flowsheet for treatment today, total treatment time and time spent performing 1:1 timed codes.       Home Exercise Program:  AROM   Wrist  Scar massage  Massage to forearm   strengthening    Next Visit:  MFR  Jt. Mobs  A/AA/PROM  Strengthening, if tolerated

## 2018-12-06 NOTE — MR AVS SNAPSHOT
After Visit Summary   12/6/2018    Maulik Connors    MRN: [id number]           Patient Information     Date Of Birth          1995        Visit Information        Provider Department      12/6/2018 11:00 AM [facility]        Today's Diagnoses     Left wrist pain           Follow-ups after your visit        Your next 10 appointments already scheduled     Dec 13, 2018  8:00 AM CST   ERUM Hand with [facility]REIDA [facility] ([facility])    [address] [place] [id number] [phone]            Dec 20, 2018  8:30 AM CST   ERUM Hand with Karlie Flores OT    [facility] ([facility])    [address] [place] [id number] [phone]              Who to contact     If you have questions or need follow up information about today's clinic visit or your schedule please contact [facility] HAND THERAP[facility] directly at [phone].  Normal or non-critical lab and imaging results will be communicated to you by [employer], letter or phone within 4 business days after the clinic has received the results. If you do not hear from us within 7 days, please contact the clinic through [employer] or phone. If you have a critical or abnormal lab result, we will notify you by phone as soon as possible.  Submit refill requests through [employer] or call your pharmacy and they will forward the refill request to us. Please allow 3 business days for your refill to be completed.          Additional Information About Your Visit        MyChart Information     [employer] gives you secure access to your electronic health record. If you see a primary care provider, you can also send messages to your care team and make appointments. If you have questions, please call your primary care clinic.  If you do not have a primary care provider, please call [phone] and they will  assist you.        Care EveryWhere ID     This is your Care EveryWhere ID. This could be used by other organizations to access your [place] medical records  [id number]         Blood Pressure from Last 3 Encounters:   No data found for BP    Weight from Last 3 Encounters:   No data found for Wt              We Performed the Following     MANUAL THER TECH,1+REGIONS,EA 15 MIN     NEUROMUSCULAR RE-EDUCATION     THERAPEUTIC EXERCISES        Primary Care Provider Fax #    Physician No Ref-Primary [phone]       No address on file        Equal Access to Services     [facility] : Hadii aad ku hadasho Soomaali, waaxda luqadaha, qaybta kaalmada adeegyada, waxay idiin hayaan adeeg kharash la'aan . So [facility] [phone].    ATENCIÓN: Si habla español, tiene a melgar disposición servicios gratuitos de asistencia lingüística. [facility] [phone].    We comply with applicable federal civil rights laws and [place] laws. We do not discriminate on the basis of race, color, national origin, age, disability, sex, sexual orientation, or gender identity.            Thank you!     Thank you for choosing [facility] HAND THERAP[facility]  for your care. Our goal is always to provide you with excellent care. Hearing back from our patients is one way we can continue to improve our services. Please take a few minutes to complete the written survey that you may receive in the mail after your visit with us. Thank you!             Your Updated Medication List - Protect others around you: Learn how to safely use, store and throw away your medicines at www.disposemymeds.org.      Notice  As of 12/6/2018 11:59 PM    You have not been prescribed any medications.

## 2018-12-13 ENCOUNTER — THERAPY VISIT (OUTPATIENT)
Dept: OCCUPATIONAL THERAPY | Facility: CLINIC | Age: 23
End: 2018-12-13
Payer: COMMERCIAL

## 2018-12-13 DIAGNOSIS — M25.532 LEFT WRIST PAIN: ICD-10-CM

## 2018-12-13 PROCEDURE — 97110 THERAPEUTIC EXERCISES: CPT | Mod: GO | Performed by: OCCUPATIONAL THERAPIST

## 2018-12-13 PROCEDURE — 97112 NEUROMUSCULAR REEDUCATION: CPT | Mod: GO | Performed by: OCCUPATIONAL THERAPIST

## 2018-12-13 PROCEDURE — 97140 MANUAL THERAPY 1/> REGIONS: CPT | Mod: GO | Performed by: OCCUPATIONAL THERAPIST

## 2018-12-13 NOTE — PROGRESS NOTES
"Hand Therapy Progress Note    Current Date:  12/13/2018    Diagnosis: L scaphoid fracture ORIF  DOI: 03/03/18  DOS:   3/9/18   04/02/18  Procedure:  ORIF    Reporting period is 11/13/18 to 12/13/2018    Subjective:   Subjective changes noted by patient:  \"I feel like it's actually been a little bit better this week!\"  Functional changes noted by patient:  Improvement in Self Care Tasks (dressing, eating, bathing, hygiene/toileting), Work Tasks, Sleep Patterns, Recreational Activities, Household Chores and Driving   Patient has noted adverse reaction to:  None    Functional Outcome Measure:  Upper Extremity Functional Index Score:  SCORE:   Column Totals: /80: 74   (A lower score indicates greater disability.)      Objective:  Pain Level Report  VAS(0-10) 7/18/2018 8/21/18 9/25/18 11/13/18 12/13/18   At Rest: 0/10 0/10 0/10 0/10 0/10   At worst: 6/10 4/10 4/10 6/10 6/10     Report of Pain:  Location:  Volar wrist, thumb CMC joint, radial wrist  Pain Quality:  sharp  Frequency: intermittent    Pain is worst:  daytime  Exacerbated by:  RD/UD, wrist extension  Relieved by:  rest  Progression:  Some improvement  Edema:  MILD  Sensation: WNL throughout all nerve distributions; per patient report    ROM  Wrist  7/18/2018 8/21/18 9/25/18 11/13/18 12/13/18   AROM (PROM) right left       Extension  After Tx 76 56 71 74 81++ 80  84   Flexion  After Tx 66 14 32 38 45 40  45   RD 21 11 16 16 17+ 16   UD 32 16 20 21 17++ 21   Supination 90 90       Pronation 80 90         Provocative Test 11/13/2018 12/13/18   Finkelstein's 6/10 -       Strength:   (Measured in pounds)  Pain Report:  - none    + mild    ++ moderate    +++ severe     7/18/2018 8/21/18 9/25/18 11/13/18 12/13/18   Trials right left       1  2  3 96  92  79 42  46  45 71  69  61 70  68  67 73  81  82 76  82  69   Average: 89 44 67 68 79 76     Lat Pinch  7/18/2018 8/21/18 9/25/18 11/13/18 12/13/18   Trials right left       1  2  3 18  19  19 12  11  12 11  13  13 " 15  14  15 14  16  15 16  16  16   Average: 19 12 12 15 15 16     3 Pt Pinch  7/18/2018 8/21/18 9/25/18 11/13/18 12/13/18   Trials right left       1  2  3 18  16  17 10  12  11 12  12  12 13  16  15 18  18  18 12  14  12   Average: 17 11 12 15 18 13   Please refer to the daily flowsheet for treatment provided today.     Assessment:  Response to therapy has been improvement to:  ROM of Wrist:  All Planes  Pain:  frequency is less    Overall Assessment:  Patient's symptoms are resolving.  Patient would benefit from continued therapy to achieve rehab potential  STG/LTG:  STGoals have been reviewed and progress or achievement has occurred;  see goal sheet for details and updates.    Plan:  Frequency/Duration:  Recommend continuing to see patient  2 X a month, once daily  for 2 months  Appropriateness of Rx I have re-evaluated this patient and find that the nature, scope, duration and intensity of the therapy is appropriate for the medical condition of the patient.  Recommendations for Continued Therapy  Continue with current POC.    Home Exercise Program:  AROM   Wrist  Scar massage  Massage to forearm   strengthening  Wrist isotonics    Next Visit:  SARAH Ross. Mobs  A/AA/PROM  Strengthening, if tolerated

## 2018-12-13 NOTE — LETTER
"[facility]  [address] [address]  [phone]    2018    Re: Maulik Connors   :   1995  MRN:  [id number]   REFERRING PHYSICIAN:   Marcus HURD MD     [facility]  Date of Initial Evaluation:  2018  Visits:  Rxs Used: 11  Reason for Referral:  Left wrist pain    EVALUATION SUMMARY    Hand Therapy Progress Note  Current Date:  2018  Diagnosis: L scaphoid fracture ORIF  DOI: 18  DOS:   3/9/18   04/02/18  Procedure:  ORIF  Reporting period is 18 to 2018    Subjective:   Subjective changes noted by patient:  \"I feel like it's actually been a little bit better this week!\"  Functional changes noted by patient:  Improvement in Self Care Tasks (dressing, eating, bathing, hygiene/toileting), Work Tasks, Sleep Patterns, Recreational Activities, Household Chores and Driving   Patient has noted adverse reaction to:  None    Functional Outcome Measure:  Upper Extremity Functional Index Score:  SCORE:   Column Totals: /80: 74   (A lower score indicates greater disability.)    Objective:  Pain Level Report  VAS(0-10) 18   At Rest: 0/10 0/10 0/10 0/10 0/10   At worst: 6/10 4/10 4/10 6/10 6/10             Maulikpadmaja Videsainsley     Report of Pain:  Location:  Volar wrist, thumb CMC joint, radial wrist  Pain Quality:  sharp  Frequency: intermittent    Pain is worst:  daytime  Exacerbated by:  RD/UD, wrist extension  Relieved by:  rest  Progression:  Some improvement  Edema:  MILD  Sensation: WNL throughout all nerve distributions; per patient report    ROM  Wrist  18   AROM (PROM) right left       Extension  After Tx 76 56 71 74 81++ 80  84   Flexion  After Tx 66 14 32 38 45 40  45   RD 21 11 16 16 17+ 16   UD 32 16 20 21 17++ 21   Supination 90 90       Pronation 80 90         Provocative Test 2018   Finkelstein's 6/10 - "       Strength:   (Measured in pounds)  Pain Report:  - none    + mild    ++ moderate    +++ severe     7/18/2018 8/21/18 9/25/18 11/13/18 12/13/18   Trials right left       1  2  3 96  92  79 42  46  45 71  69  61 70  68  67 73  81  82 76  82  69   Average: 89 44 67 68 79 76     Lat Pinch  7/18/2018 8/21/18 9/25/18 11/13/18 12/13/18   Trials right left       1  2  3 18  19  19 12  11  12 11  13  13 15  14  15 14  16  15 16  16  16   Average: 19 12 12 15 15 16                 Maulik Videslan       3 Pt Pinch  7/18/2018 8/21/18 9/25/18 11/13/18 12/13/18   Trials right left       1  2  3 18  16  17 10  12  11 12  12  12 13  16  15 18  18  18 12  14  12   Average: 17 11 12 15 18 13   Please refer to the daily flowsheet for treatment provided today.     Assessment:  Response to therapy has been improvement to:  ROM of Wrist:  All Planes  Pain:  frequency is less    Overall Assessment:  Patient's symptoms are resolving.  Patient would benefit from continued therapy to achieve rehab potential  STG/LTG:  STGoals have been reviewed and progress or achievement has occurred;  see goal sheet for details and updates.    Plan:  Frequency/Duration:  Recommend continuing to see patient  2 X a month, once daily  for 2 months  Appropriateness of Rx I have re-evaluated this patient and find that the nature, scope, duration and intensity of the therapy is appropriate for the medical condition of the patient.  Recommendations for Continued Therapy  Continue with current POC.    Home Exercise Program:  AROM   Wrist  Scar massage  Massage to forearm   strengthening  Wrist isotonics    Next Visit:  SARAH CRAMERt. Mobs  A/AA/PROM  Strengthening, if tolerated      Thank you for your referral.    INQUIRIES  Therapist: Karlie Flores, FREIDAR/L, CHT    [facility]  [address] [address]  Phone: [phone]

## 2018-12-20 ENCOUNTER — THERAPY VISIT (OUTPATIENT)
Dept: OCCUPATIONAL THERAPY | Facility: CLINIC | Age: 23
End: 2018-12-20
Payer: COMMERCIAL

## 2018-12-20 DIAGNOSIS — M25.532 LEFT WRIST PAIN: ICD-10-CM

## 2018-12-20 PROCEDURE — 97112 NEUROMUSCULAR REEDUCATION: CPT | Mod: GO | Performed by: OCCUPATIONAL THERAPIST

## 2018-12-20 PROCEDURE — 97140 MANUAL THERAPY 1/> REGIONS: CPT | Mod: GO | Performed by: OCCUPATIONAL THERAPIST

## 2018-12-20 PROCEDURE — 97110 THERAPEUTIC EXERCISES: CPT | Mod: GO | Performed by: OCCUPATIONAL THERAPIST

## 2018-12-20 NOTE — PROGRESS NOTES
SOAP note objective information for 12/20/2018.  ROM  Wrist  7/18/2018 8/21/18 9/25/18 11/13/18 12/13/18 12/20/18   AROM (PROM) right left        Extension  After Tx 76 56 71 74 81++ 80  84 80   Flexion  After Tx 66 14 32 38 45 40  45 42   RD 21 11 16 16 17+ 16 16   UD 32 16 20 21 17++ 21 22   Supination 90 90        Pronation 80 90        Please refer to the daily flowsheet for treatment today, total treatment time and time spent performing 1:1 timed codes.       Home Exercise Program:  AROM   Wrist  Scar massage  Massage to forearm   strengthening  Wrist isotonics    Next Visit:  MFR  Jt. Mobs  A/AA/PROM  Strengthening, if tolerated

## 2019-01-08 ENCOUNTER — THERAPY VISIT (OUTPATIENT)
Dept: OCCUPATIONAL THERAPY | Facility: CLINIC | Age: 24
End: 2019-01-08
Payer: COMMERCIAL

## 2019-01-08 DIAGNOSIS — M25.532 LEFT WRIST PAIN: ICD-10-CM

## 2019-01-08 PROCEDURE — 97110 THERAPEUTIC EXERCISES: CPT | Mod: GO | Performed by: OCCUPATIONAL THERAPIST

## 2019-01-08 PROCEDURE — 97140 MANUAL THERAPY 1/> REGIONS: CPT | Mod: GO | Performed by: OCCUPATIONAL THERAPIST

## 2019-01-08 NOTE — PROGRESS NOTES
"Hand Therapy Progress Note    Current Date:  1/8/2019    Diagnosis: L scaphoid fracture ORIF  DOI: 03/03/18  DOS:   3/9/18   04/02/18  Procedure:  ORIF    Reporting period is 12/13/18 to 1/8/2019    Subjective:   Subjective changes noted by patient:  \"Overall, it's been doing a lot better.  Sometimes I overdo it and I can tell.\"  Functional changes noted by patient:  Improvement in Self Care Tasks (dressing, eating, bathing, hygiene/toileting), Work Tasks, Sleep Patterns, Recreational Activities, Household Chores and Driving   Patient has noted adverse reaction to:  None    Functional Outcome Measure:  Upper Extremity Functional Index Score:  SCORE:   Column Totals: /80: 78   (A lower score indicates greater disability.)    Objective:  Pain Level Report  VAS(0-10) 7/18/2018 8/21/18 9/25/18 11/13/18 12/13/18 1/8/19   At Rest: 0/10 0/10 0/10 0/10 0/10 0/10   At worst: 6/10 4/10 4/10 6/10 6/10 6/10     Report of Pain:  Location:  Volar wrist, thumb CMC joint, radial wrist  Pain Quality:  sharp  Frequency: intermittent    Pain is worst:  daytime  Exacerbated by:  RD/UD, wrist extension  Relieved by:  rest  Progression:  Some improvement  Edema:  MILD  Sensation: WNL throughout all nerve distributions; per patient report    ROM  Wrist  7/18/2018 8/21/18 9/25/18 11/13/18 12/13/18 1/8/19   AROM (PROM) right left        Extension  After Tx 76 56 71 74 81++ 80  84 80   Flexion  After Tx 66 14 32 38 45 40  45 45   RD 21 11 16 16 17+ 16 17   UD 32 16 20 21 17++ 21 22   Supination 90 90        Pronation 80 90          Provocative Test 11/13/2018 12/13/18   Finkelstein's 6/10 -       Strength:   (Measured in pounds)  Pain Report:  - none    + mild    ++ moderate    +++ severe     7/18/2018 8/21/18 9/25/18 11/13/18 12/13/18 1/8/19   Trials right left        1  2  3 96  92  79 42  46  45 71  69  61 70  68  67 73  81  82 76  82  69 74  77  81   Average: 89 44 67 68 79 76 77     Lat Pinch  7/18/2018 8/21/18 9/25/18 11/13/18 " 12/13/18 1/8/19   Trials right left        1  2  3 18  19  19 12  11  12 11  13  13 15  14  15 14  16  15 16  16  16 17  17  17   Average: 19 12 12 15 15 16 17     3 Pt Pinch  7/18/2018 8/21/18 9/25/18 11/13/18 12/13/18 1/8/19   Trials right left        1  2  3 18  16  17 10  12  11 12  12  12 13  16  15 18  18  18 12  14  12 17  16  13   Average: 17 11 12 15 18 13 15     Please refer to the daily flowsheet for treatment provided today.     Assessment:  Response to therapy has been improvement to:  ROM of Wrist:  All Planes  Strength:   and pinch    Overall Assessment:  Patient's symptoms are resolving.  Patient is progressing well and is ready to decrease frequency of treatment in the clinic.  STG/LTG:  STGoals have been reviewed and progress or achievement has occurred;  see goal sheet for details and updates.    Plan:  Frequency/Duration:  Recommend continuing to see patient  1x/3 weeks for 2 months  Appropriateness of Rx I have re-evaluated this patient and find that the nature, scope, duration and intensity of the therapy is appropriate for the medical condition of the patient.  Recommendations for Continued Therapy  Increase weight for strengthening    Home Exercise Program:  AROM   Wrist  Scar massage  Massage to forearm   strengthening  Wrist isotonics-3#    Next Visit:  MFR  SHOAIBt. Mobs  A/AA/PROM  Strengthening, if tolerated

## 2019-01-29 ENCOUNTER — THERAPY VISIT (OUTPATIENT)
Dept: OCCUPATIONAL THERAPY | Facility: CLINIC | Age: 24
End: 2019-01-29
Payer: COMMERCIAL

## 2019-01-29 DIAGNOSIS — M25.532 LEFT WRIST PAIN: ICD-10-CM

## 2019-01-29 PROCEDURE — 97140 MANUAL THERAPY 1/> REGIONS: CPT | Mod: GO | Performed by: OCCUPATIONAL THERAPIST

## 2019-01-29 PROCEDURE — 97110 THERAPEUTIC EXERCISES: CPT | Mod: GO | Performed by: OCCUPATIONAL THERAPIST

## 2019-01-29 NOTE — PROGRESS NOTES
"Hand Therapy Discharge Note    Current Date:  1/29/2019    Diagnosis: L scaphoid fracture ORIF  DOI: 03/03/18  DOS:   3/9/18   04/02/18  Procedure:  ORIF    Reporting period is 1/8/19 to 1/29/2019    Subjective:   Subjective changes noted by patient:  \"It's been doing well!\"  Functional changes noted by patient:  Improvement in Self Care Tasks (dressing, eating, bathing, hygiene/toileting), Work Tasks, Sleep Patterns, Recreational Activities, Household Chores and Driving   Patient has noted adverse reaction to:  None    Functional Outcome Measure:  Upper Extremity Functional Index Score:  SCORE:   Column Totals: /80: 80   (A lower score indicates greater disability.)    Objective:  Pain Level Report  VAS(0-10) 7/18/2018 8/21/18 9/25/18 11/13/18 12/13/18 1/8/19 1/29/19   At Rest: 0/10 0/10 0/10 0/10 0/10 0/10 0/10   At worst: 6/10 4/10 4/10 6/10 6/10 6/10 4/10     Report of Pain:  Location:  Volar wrist, thumb CMC joint, radial wrist  Pain Quality:  sharp  Frequency: intermittent    Pain is worst:  daytime  Exacerbated by:  RD/UD, wrist extension  Relieved by:  rest  Progression:  Some improvement  Edema:  MILD  Sensation: WNL throughout all nerve distributions; per patient report    ROM  Wrist  7/18/2018 8/21/18 9/25/18 11/13/18 12/13/18 1/8/19 1/29/19   AROM (PROM) right left         Extension  After Tx 76 56 71 74 81++ 80  84 80 80   Flexion  After Tx 66 14 32 38 45 40  45 45 46   RD 21 11 16 16 17+ 16 17 20   UD 32 16 20 21 17++ 21 22 25   Supination 90 90         Pronation 80 90           Provocative Test 11/13/2018 12/13/18   Finkelstein's 6/10 -       Strength:   (Measured in pounds)  Pain Report:  - none    + mild    ++ moderate    +++ severe     7/18/2018 8/21/18 9/25/18 11/13/18 12/13/18 1/8/19 1/29/19   Trials right left         1  2  3 96  92  79 42  46  45 71  69  61 70  68  67 73  81  82 76  82  69 74  77  81 86  87  82   Average: 89 44 67 68 79 76 77 85     Lat Pinch  7/18/2018 8/21/18 9/25/18 " 11/13/18 12/13/18 1/8/19 1/29/19   Trials right left         1  2  3 18  19  19 12  11  12 11  13  13 15  14  15 14  16  15 16  16  16 17  17  17 18  18  18   Average: 19 12 12 15 15 16 17 18     3 Pt Pinch  7/18/2018 8/21/18 9/25/18 11/13/18 12/13/18 1/8/19 1/29/19   Trials right left         1  2  3 18  16  17 10  12  11 12  12  12 13  16  15 18  18  18 12  14  12 17  16  13 18  17  17   Average: 17 11 12 15 18 13 15 17   Please refer to the daily flowsheet for treatment provided today.     Assessment:  Response to therapy has been improvement to:  ROM of Wrist:  All Planes  Strength:   and pinch  Pain:  frequency is less, intensity of pain is decreased, duration of pain is decreased and less tender over affected area    Overall Assessment:  Patient's symptoms are resolving.  STG/LTG:  STGoals have been reviewed and progress or achievement has occurred;  see goal sheet for details and updates.    Plan:  Frequency/Duration:  discontinue to I HEP.  Appropriateness of Rx I have re-evaluated this patient and find that the nature, scope, duration and intensity of the therapy is appropriate for the medical condition of the patient.  Recommendations for Continued Therapy  discontinue to I HEP.    Home Exercise Program:  AROM   Wrist  Scar massage  Massage to forearm   strengthening  Wrist isotonics-3#

## 2020-03-11 ENCOUNTER — HEALTH MAINTENANCE LETTER (OUTPATIENT)
Age: 25
End: 2020-03-11

## 2021-01-03 ENCOUNTER — HEALTH MAINTENANCE LETTER (OUTPATIENT)
Age: 26
End: 2021-01-03

## 2021-04-25 ENCOUNTER — HEALTH MAINTENANCE LETTER (OUTPATIENT)
Age: 26
End: 2021-04-25

## 2021-10-10 ENCOUNTER — HEALTH MAINTENANCE LETTER (OUTPATIENT)
Age: 26
End: 2021-10-10

## 2022-05-21 ENCOUNTER — HEALTH MAINTENANCE LETTER (OUTPATIENT)
Age: 27
End: 2022-05-21

## 2022-09-18 ENCOUNTER — HEALTH MAINTENANCE LETTER (OUTPATIENT)
Age: 27
End: 2022-09-18

## 2023-06-04 ENCOUNTER — HEALTH MAINTENANCE LETTER (OUTPATIENT)
Age: 28
End: 2023-06-04

## 2024-09-05 ENCOUNTER — OFFICE VISIT (OUTPATIENT)
Dept: FAMILY MEDICINE | Facility: CLINIC | Age: 29
End: 2024-09-05
Payer: COMMERCIAL

## 2024-09-05 VITALS
TEMPERATURE: 98.3 F | SYSTOLIC BLOOD PRESSURE: 138 MMHG | OXYGEN SATURATION: 97 % | RESPIRATION RATE: 16 BRPM | HEIGHT: 74 IN | HEART RATE: 82 BPM | DIASTOLIC BLOOD PRESSURE: 74 MMHG

## 2024-09-05 DIAGNOSIS — Z00.00 ROUTINE GENERAL MEDICAL EXAMINATION AT A HEALTH CARE FACILITY: Primary | ICD-10-CM

## 2024-09-05 DIAGNOSIS — Z11.4 SCREENING FOR HIV (HUMAN IMMUNODEFICIENCY VIRUS): ICD-10-CM

## 2024-09-05 DIAGNOSIS — Z11.59 NEED FOR HEPATITIS C SCREENING TEST: ICD-10-CM

## 2024-09-05 DIAGNOSIS — Z82.49 FAMILY HISTORY OF HYPERTENSION: ICD-10-CM

## 2024-09-05 PROBLEM — J30.2 SEASONAL ALLERGIES: Status: ACTIVE | Noted: 2021-07-20

## 2024-09-05 LAB
ANION GAP SERPL CALCULATED.3IONS-SCNC: 11 MMOL/L (ref 7–15)
BUN SERPL-MCNC: 13.4 MG/DL (ref 6–20)
CALCIUM SERPL-MCNC: 10 MG/DL (ref 8.8–10.4)
CHLORIDE SERPL-SCNC: 104 MMOL/L (ref 98–107)
CHOLEST SERPL-MCNC: 165 MG/DL
CREAT SERPL-MCNC: 1.14 MG/DL (ref 0.67–1.17)
EGFRCR SERPLBLD CKD-EPI 2021: 90 ML/MIN/1.73M2
FASTING STATUS PATIENT QL REPORTED: NO
FASTING STATUS PATIENT QL REPORTED: NO
GLUCOSE SERPL-MCNC: 92 MG/DL (ref 70–99)
HCO3 SERPL-SCNC: 27 MMOL/L (ref 22–29)
HCV AB SERPL QL IA: NONREACTIVE
HDLC SERPL-MCNC: 46 MG/DL
HIV 1+2 AB+HIV1 P24 AG SERPL QL IA: NONREACTIVE
LDLC SERPL CALC-MCNC: 108 MG/DL
NONHDLC SERPL-MCNC: 119 MG/DL
POTASSIUM SERPL-SCNC: 4.3 MMOL/L (ref 3.4–5.3)
SODIUM SERPL-SCNC: 142 MMOL/L (ref 135–145)
TRIGL SERPL-MCNC: 56 MG/DL

## 2024-09-05 PROCEDURE — 90471 IMMUNIZATION ADMIN: CPT | Performed by: FAMILY MEDICINE

## 2024-09-05 PROCEDURE — 80048 BASIC METABOLIC PNL TOTAL CA: CPT | Performed by: FAMILY MEDICINE

## 2024-09-05 PROCEDURE — 80061 LIPID PANEL: CPT | Performed by: FAMILY MEDICINE

## 2024-09-05 PROCEDURE — 36415 COLL VENOUS BLD VENIPUNCTURE: CPT | Performed by: FAMILY MEDICINE

## 2024-09-05 PROCEDURE — 99385 PREV VISIT NEW AGE 18-39: CPT | Mod: 25 | Performed by: FAMILY MEDICINE

## 2024-09-05 PROCEDURE — 87389 HIV-1 AG W/HIV-1&-2 AB AG IA: CPT | Performed by: FAMILY MEDICINE

## 2024-09-05 PROCEDURE — 90656 IIV3 VACC NO PRSV 0.5 ML IM: CPT | Performed by: FAMILY MEDICINE

## 2024-09-05 PROCEDURE — 86803 HEPATITIS C AB TEST: CPT | Performed by: FAMILY MEDICINE

## 2024-09-05 NOTE — PATIENT INSTRUCTIONS
Patient Education   Preventive Care Advice   This is general advice given by our system to help you stay healthy. However, your care team may have specific advice just for you. Please talk to your care team about your preventive care needs.  Nutrition  Eat 5 or more servings of fruits and vegetables each day.  Try wheat bread, brown rice and whole grain pasta (instead of white bread, rice, and pasta).  Get enough calcium and vitamin D. Check the label on foods and aim for 100% of the RDA (recommended daily allowance).  Lifestyle  Exercise at least 150 minutes each week  (30 minutes a day, 5 days a week).  Do muscle strengthening activities 2 days a week. These help control your weight and prevent disease.  No smoking.  Wear sunscreen to prevent skin cancer.  Have a dental exam and cleaning every 6 months.  Yearly exams  See your health care team every year to talk about:  Any changes in your health.  Any medicines your care team has prescribed.  Preventive care, family planning, and ways to prevent chronic diseases.  Shots (vaccines)   HPV shots (up to age 26), if you've never had them before.  Hepatitis B shots (up to age 59), if you've never had them before.  COVID-19 shot: Get this shot when it's due.  Flu shot: Get a flu shot every year.  Tetanus shot: Get a tetanus shot every 10 years.  Pneumococcal, hepatitis A, and RSV shots: Ask your care team if you need these based on your risk.  Shingles shot (for age 50 and up)  General health tests  Diabetes screening:  Starting at age 35, Get screened for diabetes at least every 3 years.  If you are younger than age 35, ask your care team if you should be screened for diabetes.  Cholesterol test: At age 39, start having a cholesterol test every 5 years, or more often if advised.  Bone density scan (DEXA): At age 50, ask your care team if you should have this scan for osteoporosis (brittle bones).  Hepatitis C: Get tested at least once in your life.  STIs (sexually  transmitted infections)  Before age 24: Ask your care team if you should be screened for STIs.  After age 24: Get screened for STIs if you're at risk. You are at risk for STIs (including HIV) if:  You are sexually active with more than one person.  You don't use condoms every time.  You or a partner was diagnosed with a sexually transmitted infection.  If you are at risk for HIV, ask about PrEP medicine to prevent HIV.  Get tested for HIV at least once in your life, whether you are at risk for HIV or not.  Cancer screening tests  Cervical cancer screening: If you have a cervix, begin getting regular cervical cancer screening tests starting at age 21.  Breast cancer scan (mammogram): If you've ever had breasts, begin having regular mammograms starting at age 40. This is a scan to check for breast cancer.  Colon cancer screening: It is important to start screening for colon cancer at age 45.  Have a colonoscopy test every 10 years (or more often if you're at risk) Or, ask your provider about stool tests like a FIT test every year or Cologuard test every 3 years.  To learn more about your testing options, visit:   .  For help making a decision, visit:   https://bit.ly/gp39457.  Prostate cancer screening test: If you have a prostate, ask your care team if a prostate cancer screening test (PSA) at age 55 is right for you.  Lung cancer screening: If you are a current or former smoker ages 50 to 80, ask your care team if ongoing lung cancer screenings are right for you.  For informational purposes only. Not to replace the advice of your health care provider. Copyright   2023 [employer]. All rights reserved. Clinically reviewed by the [facility]itions [facility]. [employer] 801790 - REV 01/24.

## 2024-09-05 NOTE — PROGRESS NOTES
Preventive Care Visit  [facility] DENTON Dotson MD, Family Medicine  Sep 5, 2024      Assessment & Plan     (Z00.00) Routine general medical examination at a health care facility  (primary encounter diagnosis)  Comment: Reassuring exam with no abnormal findings  Plan: Follow-up in 1 year as needed    (Z82.49) Family history of hypertension  Comment: Strong family history of hypertension and some) grade 1 hypertension and the patient will get some basic labs and follow-up as needed.  Plan: Basic metabolic panel, Lipid panel reflex to         direct LDL Non-fasting        Advised to get blood pressure checked in the community periodically    (Z11.4) Screening for HIV (human immunodeficiency virus)  Comment: Routine screening low risk  Plan: HIV Screening            (Z11.59) Need for hepatitis C screening test  Comment: Routine screening low risk  Plan: Hepatitis C Screen Reflex to HCV RNA Quant and         Genotype                    Counseling  Appropriate preventive services were addressed with this patient via screening, questionnaire, or discussion as appropriate for fall prevention, nutrition, physical activity, Tobacco-use cessation, social engagement, weight loss and cognition.  Checklist reviewing preventive services available has been given to the patient.  Reviewed patient's diet, addressing concerns and/or questions.   He is at risk for psychosocial distress and has been provided with information to reduce risk.           Return in about 53 weeks (around 9/11/2025) for Annual Wellness Visit.    Mathew Willingham is a 29 year old, presenting for the following:  Physical and Consult (Est care)        9/5/2024     3:02 PM   Additional Questions   Roomed by Shaista   Accompanied by self         9/5/2024   Declines Weight   Did patient decline having their weight taken? Yes          9/5/2024    Information    services provided? No           Health Care  Directive  Patient does not have a Health Care Directive or Living Will: Discussed advance care planning with patient; however, patient declined at this time.    HPI  Presents to establish care no concerns today routine screening does have a family history of hypertension and hyperlipidemia.            9/5/2024   General Health   How would you rate your overall physical health? Excellent   Feel stress (tense, anxious, or unable to sleep) Only a little      (!) STRESS CONCERN      9/5/2024   Nutrition   Three or more servings of calcium each day? Yes   Diet: Regular (no restrictions)   How many servings of fruit and vegetables per day? (!) 0-1   How many sweetened beverages each day? 0-1            9/5/2024   Exercise   Days per week of moderate/strenous exercise 5 days   Average minutes spent exercising at this level 20 min            9/5/2024   Social Factors   Frequency of gathering with friends or relatives Twice a week   Worry food won't last until get money to buy more No   Food not last or not have enough money for food? No   Do you have housing? (Housing is defined as stable permanent housing and does not include staying ouside in a car, in a tent, in an abandoned building, in an overnight shelter, or couch-surfing.) Yes   Are you worried about losing your housing? No   Lack of transportation? No   Unable to get utilities (heat,electricity)? No            9/5/2024   Dental   Dentist two times every year? Yes            9/5/2024   TB Screening   Were you born outside of the [place]? No            Today's PHQ-2 Score:       9/5/2024     2:56 PM   PHQ-2 ( 1999 [employer])   Q1: Little interest or pleasure in doing things 0   Q2: Feeling down, depressed or hopeless 0   PHQ-2 Score 0   Q1: Little interest or pleasure in doing things Not at all   Q2: Feeling down, depressed or hopeless Not at all   PHQ-2 Score 0           9/5/2024   Substance Use   Alcohol more than 3/day or more than 7/wk No   Do you use any other  "substances recreationally? No        Social History     Tobacco Use    Smoking status: Never     Passive exposure: Never    Smokeless tobacco: Never             9/5/2024   One time HIV Screening   Previous HIV test? No          9/5/2024   STI Screening   New sexual partner(s) since last STI/HIV test? No            9/5/2024   Contraception/Family Planning   Questions about contraception or family planning No           Reviewed and updated as needed this visit by Provider   Tobacco  Allergies  Meds  Problems  Med Hx  Surg Hx  Fam Hx                     Objective    Exam  /74   Pulse 82   Temp 98.3  F (36.8  C) (Oral)   Resp 16   Ht 1.88 m (6' 2\")   SpO2 97%    There is no height or weight on file to calculate BMI.    Physical Exam  Constitutional:       General: He is not in acute distress.     Appearance: He is well-developed.   HENT:      Head: Normocephalic and atraumatic.      Right Ear: External ear normal. Tympanic membrane is not injected or erythematous.      Left Ear: External ear normal. Tympanic membrane is not erythematous.      Nose: Nose normal.      Mouth/Throat:      Mouth: Mucous membranes are moist.      Pharynx: Oropharynx is clear. Uvula midline. No oropharyngeal exudate.   Eyes:      General: No scleral icterus.        Right eye: No discharge.         Left eye: No discharge.      Conjunctiva/sclera: Conjunctivae normal.      Right eye: No exudate.     Pupils: Pupils are equal, round, and reactive to light.   Neck:      Thyroid: No thyromegaly.      Trachea: No tracheal deviation.   Cardiovascular:      Rate and Rhythm: Normal rate and regular rhythm.      Heart sounds: Normal heart sounds. No murmur heard.  Pulmonary:      Effort: Pulmonary effort is normal. No respiratory distress.      Breath sounds: Normal breath sounds. No wheezing.   Abdominal:      General: Bowel sounds are normal.      Palpations: Abdomen is soft.      Tenderness: There is no abdominal tenderness. There is " no guarding or rebound.      Hernia: There is no hernia in the left inguinal area.   Genitourinary:     Penis: Normal.       Testes: Normal.   Musculoskeletal:         General: No tenderness. Normal range of motion.      Cervical back: Normal range of motion and neck supple.   Lymphadenopathy:      Cervical: No cervical adenopathy.   Skin:     General: Skin is warm and dry.   Neurological:      Mental Status: He is alert and oriented to person, place, and time.      Cranial Nerves: No cranial nerve deficit.      Deep Tendon Reflexes: Reflexes are normal and symmetric.   Psychiatric:         Behavior: Behavior normal.         Thought Content: Thought content normal.         Judgment: Judgment normal.               Signed Electronically by: Carlos Dotson MD

## 2025-08-06 ENCOUNTER — PATIENT OUTREACH (OUTPATIENT)
Dept: CARE COORDINATION | Facility: CLINIC | Age: 30
End: 2025-08-06
Payer: COMMERCIAL